# Patient Record
Sex: MALE | Race: WHITE | Employment: OTHER | URBAN - METROPOLITAN AREA
[De-identification: names, ages, dates, MRNs, and addresses within clinical notes are randomized per-mention and may not be internally consistent; named-entity substitution may affect disease eponyms.]

---

## 2021-01-01 ENCOUNTER — APPOINTMENT (OUTPATIENT)
Dept: CT IMAGING | Age: 86
DRG: 065 | End: 2021-01-01
Attending: EMERGENCY MEDICINE
Payer: MEDICARE

## 2021-01-01 ENCOUNTER — HOSPITAL ENCOUNTER (EMERGENCY)
Dept: CT IMAGING | Age: 86
Discharge: HOME | DRG: 065 | End: 2021-06-13
Attending: EMERGENCY MEDICINE
Payer: MEDICARE

## 2021-01-01 ENCOUNTER — HOSPITAL ENCOUNTER (INPATIENT)
Age: 86
LOS: 4 days | End: 2021-06-18
Attending: INTERNAL MEDICINE | Admitting: INTERNAL MEDICINE
Payer: MEDICARE

## 2021-01-01 ENCOUNTER — HOSPICE ADMISSION (OUTPATIENT)
Dept: HOSPICE | Facility: HOSPICE | Age: 86
End: 2021-01-01
Payer: MEDICARE

## 2021-01-01 ENCOUNTER — APPOINTMENT (OUTPATIENT)
Dept: GENERAL RADIOLOGY | Age: 86
DRG: 065 | End: 2021-01-01
Attending: EMERGENCY MEDICINE
Payer: MEDICARE

## 2021-01-01 ENCOUNTER — HOSPITAL ENCOUNTER (INPATIENT)
Age: 86
LOS: 1 days | Discharge: HOSPICE | DRG: 065 | End: 2021-06-14
Attending: EMERGENCY MEDICINE | Admitting: HOSPITALIST
Payer: MEDICARE

## 2021-01-01 ENCOUNTER — HOSPITAL ENCOUNTER (EMERGENCY)
Age: 86
Discharge: HOME | End: 2021-06-06
Attending: EMERGENCY MEDICINE
Payer: MEDICARE

## 2021-01-01 VITALS
HEIGHT: 68 IN | HEART RATE: 101 BPM | WEIGHT: 160 LBS | BODY MASS INDEX: 24.25 KG/M2 | RESPIRATION RATE: 22 BRPM | SYSTOLIC BLOOD PRESSURE: 194 MMHG | OXYGEN SATURATION: 94 % | TEMPERATURE: 100.4 F | DIASTOLIC BLOOD PRESSURE: 86 MMHG

## 2021-01-01 VITALS
DIASTOLIC BLOOD PRESSURE: 59 MMHG | SYSTOLIC BLOOD PRESSURE: 123 MMHG | RESPIRATION RATE: 37 BRPM | TEMPERATURE: 101 F | HEART RATE: 72 BPM

## 2021-01-01 VITALS
OXYGEN SATURATION: 97 % | WEIGHT: 160 LBS | HEART RATE: 121 BPM | BODY MASS INDEX: 24.25 KG/M2 | HEIGHT: 68 IN | SYSTOLIC BLOOD PRESSURE: 188 MMHG | RESPIRATION RATE: 16 BRPM | TEMPERATURE: 98 F | DIASTOLIC BLOOD PRESSURE: 97 MMHG

## 2021-01-01 DIAGNOSIS — K92.1 MELENA: ICD-10-CM

## 2021-01-01 DIAGNOSIS — R53.83 FATIGUE, UNSPECIFIED TYPE: Primary | ICD-10-CM

## 2021-01-01 DIAGNOSIS — R50.9 FEVER, UNSPECIFIED FEVER CAUSE: ICD-10-CM

## 2021-01-01 DIAGNOSIS — K92.2 CHRONIC UPPER GI BLEEDING: ICD-10-CM

## 2021-01-01 DIAGNOSIS — I63.9 CEREBROVASCULAR ACCIDENT (CVA), UNSPECIFIED MECHANISM (HCC): Primary | ICD-10-CM

## 2021-01-01 DIAGNOSIS — D50.9 IRON DEFICIENCY ANEMIA, UNSPECIFIED IRON DEFICIENCY ANEMIA TYPE: ICD-10-CM

## 2021-01-01 DIAGNOSIS — R41.82 ALTERED MENTAL STATUS, UNSPECIFIED ALTERED MENTAL STATUS TYPE: ICD-10-CM

## 2021-01-01 DIAGNOSIS — I48.91 ATRIAL FIBRILLATION, UNSPECIFIED TYPE (HCC): ICD-10-CM

## 2021-01-01 LAB
ABO + RH BLD: NORMAL
ALBUMIN SERPL-MCNC: 2.5 G/DL (ref 3.2–4.6)
ALBUMIN SERPL-MCNC: 3 G/DL (ref 3.2–4.6)
ALBUMIN/GLOB SERPL: 0.5 {RATIO} (ref 1.2–3.5)
ALBUMIN/GLOB SERPL: 0.6 {RATIO} (ref 1.2–3.5)
ALP SERPL-CCNC: 233 U/L (ref 50–136)
ALP SERPL-CCNC: 236 U/L (ref 50–136)
ALT SERPL-CCNC: 21 U/L (ref 12–65)
ALT SERPL-CCNC: 23 U/L (ref 12–65)
ANION GAP SERPL CALC-SCNC: 10 MMOL/L (ref 7–16)
ANION GAP SERPL CALC-SCNC: 8 MMOL/L (ref 7–16)
ANION GAP SERPL CALC-SCNC: 8 MMOL/L (ref 7–16)
APPEARANCE UR: CLEAR
APTT PPP: 33.3 SEC (ref 24.1–35.1)
ARTERIAL PATENCY WRIST A: POSITIVE
AST SERPL-CCNC: 22 U/L (ref 15–37)
AST SERPL-CCNC: 26 U/L (ref 15–37)
ATRIAL RATE: 178 BPM
ATRIAL RATE: 241 BPM
BACTERIA SPEC CULT: NORMAL
BACTERIA URNS QL MICRO: 0 /HPF
BASE DEFICIT BLD-SCNC: 0.1 MMOL/L
BASOPHILS # BLD: 0 K/UL (ref 0–0.2)
BASOPHILS # BLD: 0 K/UL (ref 0–0.2)
BASOPHILS NFR BLD: 0 % (ref 0–2)
BASOPHILS NFR BLD: 0 % (ref 0–2)
BDY SITE: ABNORMAL
BILIRUB SERPL-MCNC: 0.6 MG/DL (ref 0.2–1.1)
BILIRUB SERPL-MCNC: 0.7 MG/DL (ref 0.2–1.1)
BILIRUB UR QL: NEGATIVE
BLOOD GROUP ANTIBODIES SERPL: NORMAL
BNP SERPL-MCNC: 4875 PG/ML
BUN SERPL-MCNC: 13 MG/DL (ref 8–23)
BUN SERPL-MCNC: 14 MG/DL (ref 8–23)
BUN SERPL-MCNC: 17 MG/DL (ref 8–23)
CALCIUM SERPL-MCNC: 8.6 MG/DL (ref 8.3–10.4)
CALCIUM SERPL-MCNC: 8.7 MG/DL (ref 8.3–10.4)
CALCIUM SERPL-MCNC: 9.4 MG/DL (ref 8.3–10.4)
CALCULATED R AXIS, ECG10: 53 DEGREES
CALCULATED R AXIS, ECG10: 76 DEGREES
CALCULATED T AXIS, ECG11: 73 DEGREES
CALCULATED T AXIS, ECG11: 77 DEGREES
CASTS URNS QL MICRO: ABNORMAL /LPF
CHLORIDE SERPL-SCNC: 103 MMOL/L (ref 98–107)
CHLORIDE SERPL-SCNC: 104 MMOL/L (ref 98–107)
CHLORIDE SERPL-SCNC: 99 MMOL/L (ref 98–107)
CHOLEST SERPL-MCNC: 77 MG/DL
CO2 BLD-SCNC: 25 MMOL/L (ref 13–23)
CO2 SERPL-SCNC: 25 MMOL/L (ref 21–32)
CO2 SERPL-SCNC: 26 MMOL/L (ref 21–32)
CO2 SERPL-SCNC: 27 MMOL/L (ref 21–32)
COLOR UR: YELLOW
COVID-19 RAPID TEST, COVR: NOT DETECTED
CREAT SERPL-MCNC: 0.55 MG/DL (ref 0.8–1.5)
CREAT SERPL-MCNC: 0.62 MG/DL (ref 0.8–1.5)
CREAT SERPL-MCNC: 0.83 MG/DL (ref 0.8–1.5)
DIAGNOSIS, 93000: NORMAL
DIAGNOSIS, 93000: NORMAL
DIFFERENTIAL METHOD BLD: ABNORMAL
DIFFERENTIAL METHOD BLD: ABNORMAL
EOSINOPHIL # BLD: 0 K/UL (ref 0–0.8)
EOSINOPHIL # BLD: 0 K/UL (ref 0–0.8)
EOSINOPHIL NFR BLD: 0 % (ref 0.5–7.8)
EOSINOPHIL NFR BLD: 0 % (ref 0.5–7.8)
EPI CELLS #/AREA URNS HPF: 0 /HPF
ERYTHROCYTE [DISTWIDTH] IN BLOOD BY AUTOMATED COUNT: 20.5 % (ref 11.9–14.6)
ERYTHROCYTE [DISTWIDTH] IN BLOOD BY AUTOMATED COUNT: 21.1 % (ref 11.9–14.6)
ERYTHROCYTE [DISTWIDTH] IN BLOOD BY AUTOMATED COUNT: 21.2 % (ref 11.9–14.6)
FIO2, L/MIN - FIO2P: 3
GAS FLOW.O2 O2 DELIVERY SYS: ABNORMAL L/MIN
GLOBULIN SER CALC-MCNC: 4.6 G/DL (ref 2.3–3.5)
GLOBULIN SER CALC-MCNC: 4.8 G/DL (ref 2.3–3.5)
GLUCOSE SERPL-MCNC: 116 MG/DL (ref 65–100)
GLUCOSE SERPL-MCNC: 129 MG/DL (ref 65–100)
GLUCOSE SERPL-MCNC: 166 MG/DL (ref 65–100)
GLUCOSE UR STRIP.AUTO-MCNC: NEGATIVE MG/DL
HCO3 BLD-SCNC: 24.1 MMOL/L (ref 22–26)
HCT VFR BLD AUTO: 30.3 % (ref 41.1–50.3)
HCT VFR BLD AUTO: 31.7 % (ref 41.1–50.3)
HCT VFR BLD AUTO: 33.8 % (ref 41.1–50.3)
HDLC SERPL-MCNC: 31 MG/DL (ref 40–60)
HDLC SERPL: 2.5 {RATIO}
HGB BLD-MCNC: 10.4 G/DL (ref 13.6–17.2)
HGB BLD-MCNC: 8.9 G/DL (ref 13.6–17.2)
HGB BLD-MCNC: 9.6 G/DL (ref 13.6–17.2)
HGB RETIC QN AUTO: 26 PG (ref 29–35)
HGB UR QL STRIP: ABNORMAL
IMM GRANULOCYTES # BLD AUTO: 0.1 K/UL (ref 0–0.5)
IMM GRANULOCYTES # BLD AUTO: 0.1 K/UL (ref 0–0.5)
IMM GRANULOCYTES NFR BLD AUTO: 1 % (ref 0–5)
IMM GRANULOCYTES NFR BLD AUTO: 1 % (ref 0–5)
IMM RETICS NFR: 24 % (ref 2.3–13.4)
INR PPP: 1.2
IRON SATN MFR SERPL: 5 %
IRON SERPL-MCNC: 13 UG/DL (ref 35–150)
KETONES UR QL STRIP.AUTO: 15 MG/DL
LACTATE SERPL-SCNC: 1.5 MMOL/L (ref 0.4–2)
LACTATE SERPL-SCNC: 1.6 MMOL/L (ref 0.4–2)
LDLC SERPL CALC-MCNC: 28.4 MG/DL
LEUKOCYTE ESTERASE UR QL STRIP.AUTO: NEGATIVE
LIPASE SERPL-CCNC: 52 U/L (ref 73–393)
LYMPHOCYTES # BLD: 0.5 K/UL (ref 0.5–4.6)
LYMPHOCYTES # BLD: 0.6 K/UL (ref 0.5–4.6)
LYMPHOCYTES NFR BLD: 5 % (ref 13–44)
LYMPHOCYTES NFR BLD: 5 % (ref 13–44)
MAGNESIUM SERPL-MCNC: 2 MG/DL (ref 1.8–2.4)
MAGNESIUM SERPL-MCNC: 2.2 MG/DL (ref 1.8–2.4)
MCH RBC QN AUTO: 23.5 PG (ref 26.1–32.9)
MCH RBC QN AUTO: 23.8 PG (ref 26.1–32.9)
MCH RBC QN AUTO: 23.9 PG (ref 26.1–32.9)
MCHC RBC AUTO-ENTMCNC: 29.4 G/DL (ref 31.4–35)
MCHC RBC AUTO-ENTMCNC: 30.3 G/DL (ref 31.4–35)
MCHC RBC AUTO-ENTMCNC: 30.8 G/DL (ref 31.4–35)
MCV RBC AUTO: 77.7 FL (ref 79.6–97.8)
MCV RBC AUTO: 78.7 FL (ref 79.6–97.8)
MCV RBC AUTO: 79.9 FL (ref 79.6–97.8)
MONOCYTES # BLD: 1.1 K/UL (ref 0.1–1.3)
MONOCYTES # BLD: 1.3 K/UL (ref 0.1–1.3)
MONOCYTES NFR BLD: 12 % (ref 4–12)
MONOCYTES NFR BLD: 9 % (ref 4–12)
NEUTS SEG # BLD: 10.1 K/UL (ref 1.7–8.2)
NEUTS SEG # BLD: 8.7 K/UL (ref 1.7–8.2)
NEUTS SEG NFR BLD: 81 % (ref 43–78)
NEUTS SEG NFR BLD: 85 % (ref 43–78)
NITRITE UR QL STRIP.AUTO: NEGATIVE
NRBC # BLD: 0 K/UL (ref 0–0.2)
PCO2 BLD: 36.5 MMHG (ref 35–45)
PH BLD: 7.43 [PH] (ref 7.35–7.45)
PH UR STRIP: 6 [PH] (ref 5–9)
PLATELET # BLD AUTO: 369 K/UL (ref 150–450)
PLATELET # BLD AUTO: 411 K/UL (ref 150–450)
PLATELET # BLD AUTO: 550 K/UL (ref 150–450)
PLATELET COMMENTS,PCOM: ADEQUATE
PMV BLD AUTO: 10.1 FL (ref 9.4–12.3)
PMV BLD AUTO: 9.3 FL (ref 9.4–12.3)
PMV BLD AUTO: 9.6 FL (ref 9.4–12.3)
PO2 BLD: 151 MMHG (ref 75–100)
POTASSIUM SERPL-SCNC: 3.8 MMOL/L (ref 3.5–5.1)
POTASSIUM SERPL-SCNC: 4 MMOL/L (ref 3.5–5.1)
POTASSIUM SERPL-SCNC: 4.4 MMOL/L (ref 3.5–5.1)
PROCALCITONIN SERPL-MCNC: 0.39 NG/ML
PROT SERPL-MCNC: 7.1 G/DL (ref 6.3–8.2)
PROT SERPL-MCNC: 7.8 G/DL (ref 6.3–8.2)
PROT UR STRIP-MCNC: ABNORMAL MG/DL
PROTHROMBIN TIME: 15.2 SEC (ref 12.5–14.7)
Q-T INTERVAL, ECG07: 334 MS
Q-T INTERVAL, ECG07: 392 MS
QRS DURATION, ECG06: 92 MS
QRS DURATION, ECG06: 98 MS
QTC CALCULATION (BEZET), ECG08: 452 MS
QTC CALCULATION (BEZET), ECG08: 469 MS
RBC # BLD AUTO: 3.79 M/UL (ref 4.23–5.6)
RBC # BLD AUTO: 4.03 M/UL (ref 4.23–5.6)
RBC # BLD AUTO: 4.35 M/UL (ref 4.23–5.6)
RBC #/AREA URNS HPF: ABNORMAL /HPF
RBC MORPH BLD: ABNORMAL
RBC MORPH BLD: ABNORMAL
RETICS # AUTO: 0.08 M/UL (ref 0.03–0.1)
RETICS/RBC NFR AUTO: 1.9 % (ref 0.3–2)
SAO2 % BLD: 99.4 % (ref 95–98)
SARS-COV-2, COV2: NORMAL
SARS-COV-2, COV2: NOT DETECTED
SERVICE CMNT-IMP: ABNORMAL
SERVICE CMNT-IMP: NORMAL
SODIUM SERPL-SCNC: 134 MMOL/L (ref 136–145)
SODIUM SERPL-SCNC: 137 MMOL/L (ref 138–145)
SODIUM SERPL-SCNC: 139 MMOL/L (ref 138–145)
SOURCE, COVRS: NORMAL
SP GR UR REFRACTOMETRY: 1.02 (ref 1–1.02)
SPECIMEN EXP DATE BLD: NORMAL
SPECIMEN SOURCE, FCOV2M: NORMAL
SPECIMEN TYPE: ABNORMAL
TIBC SERPL-MCNC: 237 UG/DL (ref 250–450)
TRIGL SERPL-MCNC: 88 MG/DL (ref 35–150)
TROPONIN-HIGH SENSITIVITY: 16.2 PG/ML (ref 0–14)
TROPONIN-HIGH SENSITIVITY: 17.5 PG/ML (ref 0–14)
TROPONIN-HIGH SENSITIVITY: 302.2 PG/ML (ref 0–14)
TROPONIN-HIGH SENSITIVITY: 616.5 PG/ML (ref 0–14)
TROPONIN-HIGH SENSITIVITY: 710.2 PG/ML (ref 0–14)
TROPONIN-HIGH SENSITIVITY: 8.6 PG/ML (ref 0–14)
TSH SERPL DL<=0.005 MIU/L-ACNC: 0.61 UIU/ML (ref 0.36–3.74)
UROBILINOGEN UR QL STRIP.AUTO: 1 EU/DL (ref 0.2–1)
VENTRICULAR RATE, ECG03: 110 BPM
VENTRICULAR RATE, ECG03: 86 BPM
VLDLC SERPL CALC-MCNC: 17.6 MG/DL (ref 6–23)
WBC # BLD AUTO: 10.7 K/UL (ref 4.3–11.1)
WBC # BLD AUTO: 11.9 K/UL (ref 4.3–11.1)
WBC # BLD AUTO: 13.6 K/UL (ref 4.3–11.1)
WBC MORPH BLD: ABNORMAL
WBC URNS QL MICRO: ABNORMAL /HPF

## 2021-01-01 PROCEDURE — 74011250637 HC RX REV CODE- 250/637: Performed by: NURSE PRACTITIONER

## 2021-01-01 PROCEDURE — 74011250636 HC RX REV CODE- 250/636: Performed by: NURSE PRACTITIONER

## 2021-01-01 PROCEDURE — 85610 PROTHROMBIN TIME: CPT

## 2021-01-01 PROCEDURE — 83690 ASSAY OF LIPASE: CPT

## 2021-01-01 PROCEDURE — 85025 COMPLETE CBC W/AUTO DIFF WBC: CPT

## 2021-01-01 PROCEDURE — 74011000250 HC RX REV CODE- 250: Performed by: NURSE PRACTITIONER

## 2021-01-01 PROCEDURE — 0656 HSPC GENERAL INPATIENT

## 2021-01-01 PROCEDURE — 83735 ASSAY OF MAGNESIUM: CPT

## 2021-01-01 PROCEDURE — 74011000258 HC RX REV CODE- 258: Performed by: HOSPITALIST

## 2021-01-01 PROCEDURE — 65610000001 HC ROOM ICU GENERAL

## 2021-01-01 PROCEDURE — 74011250636 HC RX REV CODE- 250/636: Performed by: HOSPITALIST

## 2021-01-01 PROCEDURE — 80053 COMPREHEN METABOLIC PANEL: CPT

## 2021-01-01 PROCEDURE — C9113 INJ PANTOPRAZOLE SODIUM, VIA: HCPCS | Performed by: HOSPITALIST

## 2021-01-01 PROCEDURE — 36600 WITHDRAWAL OF ARTERIAL BLOOD: CPT

## 2021-01-01 PROCEDURE — 85046 RETICYTE/HGB CONCENTRATE: CPT

## 2021-01-01 PROCEDURE — 87635 SARS-COV-2 COVID-19 AMP PRB: CPT

## 2021-01-01 PROCEDURE — 99285 EMERGENCY DEPT VISIT HI MDM: CPT

## 2021-01-01 PROCEDURE — G0299 HHS/HOSPICE OF RN EA 15 MIN: HCPCS

## 2021-01-01 PROCEDURE — 83605 ASSAY OF LACTIC ACID: CPT

## 2021-01-01 PROCEDURE — 93005 ELECTROCARDIOGRAM TRACING: CPT | Performed by: EMERGENCY MEDICINE

## 2021-01-01 PROCEDURE — 99283 EMERGENCY DEPT VISIT LOW MDM: CPT

## 2021-01-01 PROCEDURE — 96374 THER/PROPH/DIAG INJ IV PUSH: CPT

## 2021-01-01 PROCEDURE — 2709999900 HC NON-CHARGEABLE SUPPLY

## 2021-01-01 PROCEDURE — 83540 ASSAY OF IRON: CPT

## 2021-01-01 PROCEDURE — 84484 ASSAY OF TROPONIN QUANT: CPT

## 2021-01-01 PROCEDURE — 80061 LIPID PANEL: CPT

## 2021-01-01 PROCEDURE — C9113 INJ PANTOPRAZOLE SODIUM, VIA: HCPCS | Performed by: EMERGENCY MEDICINE

## 2021-01-01 PROCEDURE — 87086 URINE CULTURE/COLONY COUNT: CPT

## 2021-01-01 PROCEDURE — 70450 CT HEAD/BRAIN W/O DYE: CPT

## 2021-01-01 PROCEDURE — 74011000258 HC RX REV CODE- 258: Performed by: EMERGENCY MEDICINE

## 2021-01-01 PROCEDURE — 85027 COMPLETE CBC AUTOMATED: CPT

## 2021-01-01 PROCEDURE — 0042T CT PERF W CBF: CPT

## 2021-01-01 PROCEDURE — U0005 INFEC AGEN DETEC AMPLI PROBE: HCPCS

## 2021-01-01 PROCEDURE — 74011000250 HC RX REV CODE- 250: Performed by: HOSPITALIST

## 2021-01-01 PROCEDURE — 86850 RBC ANTIBODY SCREEN: CPT

## 2021-01-01 PROCEDURE — 70496 CT ANGIOGRAPHY HEAD: CPT

## 2021-01-01 PROCEDURE — 84145 PROCALCITONIN (PCT): CPT

## 2021-01-01 PROCEDURE — 84443 ASSAY THYROID STIM HORMONE: CPT

## 2021-01-01 PROCEDURE — 96375 TX/PRO/DX INJ NEW DRUG ADDON: CPT

## 2021-01-01 PROCEDURE — 80048 BASIC METABOLIC PNL TOTAL CA: CPT

## 2021-01-01 PROCEDURE — 81001 URINALYSIS AUTO W/SCOPE: CPT

## 2021-01-01 PROCEDURE — 74011000636 HC RX REV CODE- 636: Performed by: EMERGENCY MEDICINE

## 2021-01-01 PROCEDURE — 85730 THROMBOPLASTIN TIME PARTIAL: CPT

## 2021-01-01 PROCEDURE — 74011250636 HC RX REV CODE- 250/636: Performed by: EMERGENCY MEDICINE

## 2021-01-01 PROCEDURE — 81003 URINALYSIS AUTO W/O SCOPE: CPT

## 2021-01-01 PROCEDURE — 96376 TX/PRO/DX INJ SAME DRUG ADON: CPT

## 2021-01-01 PROCEDURE — 82803 BLOOD GASES ANY COMBINATION: CPT

## 2021-01-01 PROCEDURE — 83880 ASSAY OF NATRIURETIC PEPTIDE: CPT

## 2021-01-01 PROCEDURE — 87040 BLOOD CULTURE FOR BACTERIA: CPT

## 2021-01-01 PROCEDURE — 4A03X5D MEASUREMENT OF ARTERIAL FLOW, INTRACRANIAL, EXTERNAL APPROACH: ICD-10-PCS | Performed by: RADIOLOGY

## 2021-01-01 PROCEDURE — 74011000250 HC RX REV CODE- 250: Performed by: EMERGENCY MEDICINE

## 2021-01-01 PROCEDURE — 36415 COLL VENOUS BLD VENIPUNCTURE: CPT

## 2021-01-01 PROCEDURE — 3336500001 HSPC ELECTION

## 2021-01-01 PROCEDURE — 71045 X-RAY EXAM CHEST 1 VIEW: CPT

## 2021-01-01 PROCEDURE — 74011250637 HC RX REV CODE- 250/637: Performed by: EMERGENCY MEDICINE

## 2021-01-01 RX ORDER — FUROSEMIDE 10 MG/ML
40 INJECTION INTRAMUSCULAR; INTRAVENOUS
Status: COMPLETED | OUTPATIENT
Start: 2021-01-01 | End: 2021-01-01

## 2021-01-01 RX ORDER — SODIUM CHLORIDE 0.9 % (FLUSH) 0.9 %
5-10 SYRINGE (ML) INJECTION AS NEEDED
Status: DISCONTINUED | OUTPATIENT
Start: 2021-01-01 | End: 2021-01-01 | Stop reason: HOSPADM

## 2021-01-01 RX ORDER — LABETALOL HYDROCHLORIDE 5 MG/ML
10 INJECTION, SOLUTION INTRAVENOUS
Status: DISCONTINUED | OUTPATIENT
Start: 2021-01-01 | End: 2021-01-01

## 2021-01-01 RX ORDER — MORPHINE SULFATE 2 MG/ML
2 INJECTION, SOLUTION INTRAMUSCULAR; INTRAVENOUS EVERY 6 HOURS
Status: DISCONTINUED | OUTPATIENT
Start: 2021-01-01 | End: 2021-01-01

## 2021-01-01 RX ORDER — ASPIRIN 325 MG
325 TABLET ORAL ONCE
Status: DISCONTINUED | OUTPATIENT
Start: 2021-01-01 | End: 2021-01-01

## 2021-01-01 RX ORDER — HALOPERIDOL 5 MG/ML
2 INJECTION INTRAMUSCULAR
Status: DISCONTINUED | OUTPATIENT
Start: 2021-01-01 | End: 2021-06-19 | Stop reason: HOSPADM

## 2021-01-01 RX ORDER — METOPROLOL TARTRATE 5 MG/5ML
5 INJECTION INTRAVENOUS EVERY 6 HOURS
Status: DISCONTINUED | OUTPATIENT
Start: 2021-01-01 | End: 2021-01-01

## 2021-01-01 RX ORDER — ACETAMINOPHEN 650 MG/1
650 SUPPOSITORY RECTAL
Status: DISCONTINUED | OUTPATIENT
Start: 2021-01-01 | End: 2021-06-19 | Stop reason: HOSPADM

## 2021-01-01 RX ORDER — SODIUM CHLORIDE 0.9 % (FLUSH) 0.9 %
10 SYRINGE (ML) INJECTION
Status: COMPLETED | OUTPATIENT
Start: 2021-01-01 | End: 2021-01-01

## 2021-01-01 RX ORDER — MORPHINE SULFATE 4 MG/ML
4 INJECTION INTRAVENOUS EVERY 6 HOURS
Status: DISCONTINUED | OUTPATIENT
Start: 2021-01-01 | End: 2021-06-19 | Stop reason: HOSPADM

## 2021-01-01 RX ORDER — FAMOTIDINE 20 MG/1
20 TABLET, FILM COATED ORAL EVERY 12 HOURS
Status: DISCONTINUED | OUTPATIENT
Start: 2021-01-01 | End: 2021-01-01

## 2021-01-01 RX ORDER — HYDRALAZINE HYDROCHLORIDE 20 MG/ML
20 INJECTION INTRAMUSCULAR; INTRAVENOUS ONCE
Status: COMPLETED | OUTPATIENT
Start: 2021-01-01 | End: 2021-01-01

## 2021-01-01 RX ORDER — FACIAL-BODY WIPES
10 EACH TOPICAL AS NEEDED
Status: DISCONTINUED | OUTPATIENT
Start: 2021-01-01 | End: 2021-06-19 | Stop reason: HOSPADM

## 2021-01-01 RX ORDER — MORPHINE SULFATE 4 MG/ML
4 INJECTION INTRAVENOUS
Status: DISCONTINUED | OUTPATIENT
Start: 2021-01-01 | End: 2021-06-19 | Stop reason: HOSPADM

## 2021-01-01 RX ORDER — MORPHINE SULFATE 2 MG/ML
2 INJECTION, SOLUTION INTRAMUSCULAR; INTRAVENOUS
Status: DISCONTINUED | OUTPATIENT
Start: 2021-01-01 | End: 2021-01-01

## 2021-01-01 RX ORDER — SODIUM CHLORIDE 0.9 % (FLUSH) 0.9 %
5-40 SYRINGE (ML) INJECTION EVERY 8 HOURS
Status: DISCONTINUED | OUTPATIENT
Start: 2021-01-01 | End: 2021-01-01

## 2021-01-01 RX ORDER — SODIUM CHLORIDE 0.9 % (FLUSH) 0.9 %
5-10 SYRINGE (ML) INJECTION EVERY 8 HOURS
Status: DISCONTINUED | OUTPATIENT
Start: 2021-01-01 | End: 2021-01-01 | Stop reason: HOSPADM

## 2021-01-01 RX ORDER — SODIUM CHLORIDE 0.9 % (FLUSH) 0.9 %
5-40 SYRINGE (ML) INJECTION AS NEEDED
Status: DISCONTINUED | OUTPATIENT
Start: 2021-01-01 | End: 2021-01-01 | Stop reason: HOSPADM

## 2021-01-01 RX ORDER — ACETAMINOPHEN 650 MG/1
650 SUPPOSITORY RECTAL
Status: COMPLETED | OUTPATIENT
Start: 2021-01-01 | End: 2021-01-01

## 2021-01-01 RX ORDER — LORAZEPAM 2 MG/ML
2 INJECTION INTRAMUSCULAR
Status: DISCONTINUED | OUTPATIENT
Start: 2021-01-01 | End: 2021-06-19 | Stop reason: HOSPADM

## 2021-01-01 RX ORDER — POLYETHYLENE GLYCOL 3350 17 G/17G
17 POWDER, FOR SOLUTION ORAL DAILY
Status: DISCONTINUED | OUTPATIENT
Start: 2021-01-01 | End: 2021-01-01

## 2021-01-01 RX ORDER — CARBOXYMETHYLCELLULOSE SODIUM 10 MG/ML
1 GEL OPHTHALMIC AS NEEDED
Status: DISCONTINUED | OUTPATIENT
Start: 2021-01-01 | End: 2021-06-19 | Stop reason: HOSPADM

## 2021-01-01 RX ORDER — MORPHINE SULFATE 2 MG/ML
2 INJECTION, SOLUTION INTRAMUSCULAR; INTRAVENOUS
Status: DISCONTINUED | OUTPATIENT
Start: 2021-01-01 | End: 2021-01-01 | Stop reason: HOSPADM

## 2021-01-01 RX ORDER — SODIUM CHLORIDE 0.9 % (FLUSH) 0.9 %
3 SYRINGE (ML) INJECTION AS NEEDED
Status: DISCONTINUED | OUTPATIENT
Start: 2021-01-01 | End: 2021-06-19 | Stop reason: HOSPADM

## 2021-01-01 RX ORDER — GLYCOPYRROLATE 0.2 MG/ML
0.2 INJECTION INTRAMUSCULAR; INTRAVENOUS
Status: DISCONTINUED | OUTPATIENT
Start: 2021-01-01 | End: 2021-06-19 | Stop reason: HOSPADM

## 2021-01-01 RX ORDER — VANCOMYCIN/0.9 % SOD CHLORIDE 1.5G/250ML
1500 PLASTIC BAG, INJECTION (ML) INTRAVENOUS ONCE
Status: COMPLETED | OUTPATIENT
Start: 2021-01-01 | End: 2021-01-01

## 2021-01-01 RX ORDER — SODIUM CHLORIDE 0.9 % (FLUSH) 0.9 %
3 SYRINGE (ML) INJECTION EVERY 12 HOURS
Status: DISCONTINUED | OUTPATIENT
Start: 2021-01-01 | End: 2021-06-19 | Stop reason: HOSPADM

## 2021-01-01 RX ORDER — ATORVASTATIN CALCIUM 40 MG/1
40 TABLET, FILM COATED ORAL
Status: DISCONTINUED | OUTPATIENT
Start: 2021-01-01 | End: 2021-01-01

## 2021-01-01 RX ORDER — LORAZEPAM 2 MG/ML
1 INJECTION INTRAMUSCULAR
Status: DISCONTINUED | OUTPATIENT
Start: 2021-01-01 | End: 2021-01-01 | Stop reason: HOSPADM

## 2021-01-01 RX ORDER — CARBOXYMETHYLCELLULOSE SODIUM 10 MG/ML
1 GEL OPHTHALMIC EVERY 6 HOURS
Status: DISCONTINUED | OUTPATIENT
Start: 2021-01-01 | End: 2021-06-19 | Stop reason: HOSPADM

## 2021-01-01 RX ORDER — HYDRALAZINE HYDROCHLORIDE 20 MG/ML
10 INJECTION INTRAMUSCULAR; INTRAVENOUS ONCE
Status: COMPLETED | OUTPATIENT
Start: 2021-01-01 | End: 2021-01-01

## 2021-01-01 RX ORDER — DILTIAZEM HYDROCHLORIDE 5 MG/ML
10 INJECTION INTRAVENOUS ONCE
Status: COMPLETED | OUTPATIENT
Start: 2021-01-01 | End: 2021-01-01

## 2021-01-01 RX ORDER — LANOLIN ALCOHOL/MO/W.PET/CERES
CREAM (GRAM) TOPICAL
COMMUNITY
End: 2021-01-01

## 2021-01-01 RX ORDER — OMEPRAZOLE 20 MG/1
20 CAPSULE, DELAYED RELEASE ORAL 2 TIMES DAILY
Qty: 30 CAPSULE | Refills: 0 | Status: SHIPPED | OUTPATIENT
Start: 2021-01-01 | End: 2021-01-01

## 2021-01-01 RX ADMIN — SODIUM CHLORIDE, PRESERVATIVE FREE 3 ML: 5 INJECTION INTRAVENOUS at 05:43

## 2021-01-01 RX ADMIN — MORPHINE SULFATE 2 MG: 2 INJECTION, SOLUTION INTRAMUSCULAR; INTRAVENOUS at 03:59

## 2021-01-01 RX ADMIN — MORPHINE SULFATE 4 MG: 4 INJECTION INTRAVENOUS at 12:43

## 2021-01-01 RX ADMIN — SODIUM CHLORIDE, PRESERVATIVE FREE 3 ML: 5 INJECTION INTRAVENOUS at 04:52

## 2021-01-01 RX ADMIN — SODIUM CHLORIDE, PRESERVATIVE FREE 3 ML: 5 INJECTION INTRAVENOUS at 08:29

## 2021-01-01 RX ADMIN — MORPHINE SULFATE 2 MG: 2 INJECTION, SOLUTION INTRAMUSCULAR; INTRAVENOUS at 04:52

## 2021-01-01 RX ADMIN — Medication 10 ML: at 21:12

## 2021-01-01 RX ADMIN — SODIUM CHLORIDE, PRESERVATIVE FREE 3 ML: 5 INJECTION INTRAVENOUS at 03:59

## 2021-01-01 RX ADMIN — PIPERACILLIN AND TAZOBACTAM 3.38 G: 3; .375 INJECTION, POWDER, LYOPHILIZED, FOR SOLUTION INTRAVENOUS at 21:14

## 2021-01-01 RX ADMIN — SODIUM CHLORIDE, PRESERVATIVE FREE 3 ML: 5 INJECTION INTRAVENOUS at 20:29

## 2021-01-01 RX ADMIN — METOPROLOL TARTRATE 5 MG: 5 INJECTION INTRAVENOUS at 21:14

## 2021-01-01 RX ADMIN — MORPHINE SULFATE 2 MG: 2 INJECTION, SOLUTION INTRAMUSCULAR; INTRAVENOUS at 10:20

## 2021-01-01 RX ADMIN — SODIUM CHLORIDE, PRESERVATIVE FREE 3 ML: 5 INJECTION INTRAVENOUS at 10:22

## 2021-01-01 RX ADMIN — MORPHINE SULFATE 2 MG: 2 INJECTION, SOLUTION INTRAMUSCULAR; INTRAVENOUS at 20:29

## 2021-01-01 RX ADMIN — Medication 10 ML: at 17:03

## 2021-01-01 RX ADMIN — SODIUM CHLORIDE, PRESERVATIVE FREE 3 ML: 5 INJECTION INTRAVENOUS at 17:51

## 2021-01-01 RX ADMIN — SODIUM CHLORIDE, PRESERVATIVE FREE 3 ML: 5 INJECTION INTRAVENOUS at 15:27

## 2021-01-01 RX ADMIN — SODIUM CHLORIDE, PRESERVATIVE FREE 3 ML: 5 INJECTION INTRAVENOUS at 02:07

## 2021-01-01 RX ADMIN — SODIUM CHLORIDE, PRESERVATIVE FREE 3 ML: 5 INJECTION INTRAVENOUS at 07:42

## 2021-01-01 RX ADMIN — HALOPERIDOL LACTATE 2 MG: 5 INJECTION, SOLUTION INTRAMUSCULAR at 17:38

## 2021-01-01 RX ADMIN — MORPHINE SULFATE 2 MG: 2 INJECTION, SOLUTION INTRAMUSCULAR; INTRAVENOUS at 22:15

## 2021-01-01 RX ADMIN — HYDRALAZINE HYDROCHLORIDE 20 MG: 20 INJECTION, SOLUTION INTRAMUSCULAR; INTRAVENOUS at 16:34

## 2021-01-01 RX ADMIN — SODIUM CHLORIDE, PRESERVATIVE FREE 3 ML: 5 INJECTION INTRAVENOUS at 21:08

## 2021-01-01 RX ADMIN — HALOPERIDOL LACTATE 2 MG: 5 INJECTION, SOLUTION INTRAMUSCULAR at 10:21

## 2021-01-01 RX ADMIN — MORPHINE SULFATE 2 MG: 2 INJECTION, SOLUTION INTRAMUSCULAR; INTRAVENOUS at 17:10

## 2021-01-01 RX ADMIN — PANTOPRAZOLE SODIUM 40 MG: 40 INJECTION, POWDER, FOR SOLUTION INTRAVENOUS at 08:19

## 2021-01-01 RX ADMIN — MORPHINE SULFATE 2 MG: 2 INJECTION, SOLUTION INTRAMUSCULAR; INTRAVENOUS at 05:43

## 2021-01-01 RX ADMIN — MORPHINE SULFATE 2 MG: 2 INJECTION, SOLUTION INTRAMUSCULAR; INTRAVENOUS at 15:26

## 2021-01-01 RX ADMIN — PANTOPRAZOLE SODIUM 40 MG: 40 INJECTION, POWDER, FOR SOLUTION INTRAVENOUS at 21:14

## 2021-01-01 RX ADMIN — MORPHINE SULFATE 2 MG: 2 INJECTION, SOLUTION INTRAMUSCULAR; INTRAVENOUS at 16:34

## 2021-01-01 RX ADMIN — SODIUM CHLORIDE, PRESERVATIVE FREE 3 ML: 5 INJECTION INTRAVENOUS at 17:11

## 2021-01-01 RX ADMIN — ACETAMINOPHEN 650 MG: 650 SUPPOSITORY RECTAL at 17:10

## 2021-01-01 RX ADMIN — Medication 10 ML: at 05:22

## 2021-01-01 RX ADMIN — ACETAMINOPHEN 650 MG: 650 SUPPOSITORY RECTAL at 15:36

## 2021-01-01 RX ADMIN — PIPERACILLIN AND TAZOBACTAM 3.38 G: 3; .375 INJECTION, POWDER, LYOPHILIZED, FOR SOLUTION INTRAVENOUS at 05:22

## 2021-01-01 RX ADMIN — PANTOPRAZOLE SODIUM 40 MG: 40 INJECTION, POWDER, FOR SOLUTION INTRAVENOUS at 18:03

## 2021-01-01 RX ADMIN — IOPAMIDOL 100 ML: 755 INJECTION, SOLUTION INTRAVENOUS at 16:59

## 2021-01-01 RX ADMIN — DILTIAZEM HYDROCHLORIDE 10 MG: 5 INJECTION INTRAVENOUS at 18:01

## 2021-01-01 RX ADMIN — SODIUM CHLORIDE, PRESERVATIVE FREE 3 ML: 5 INJECTION INTRAVENOUS at 20:22

## 2021-01-01 RX ADMIN — CARBOXYMETHYLCELLULOSE SODIUM 1 DROP: 10 GEL OPHTHALMIC at 17:51

## 2021-01-01 RX ADMIN — MORPHINE SULFATE 2 MG: 2 INJECTION, SOLUTION INTRAMUSCULAR; INTRAVENOUS at 10:22

## 2021-01-01 RX ADMIN — SODIUM CHLORIDE, PRESERVATIVE FREE 3 ML: 5 INJECTION INTRAVENOUS at 10:21

## 2021-01-01 RX ADMIN — MORPHINE SULFATE 2 MG: 2 INJECTION, SOLUTION INTRAMUSCULAR; INTRAVENOUS at 21:08

## 2021-01-01 RX ADMIN — MORPHINE SULFATE 2 MG: 2 INJECTION, SOLUTION INTRAMUSCULAR; INTRAVENOUS at 20:54

## 2021-01-01 RX ADMIN — SODIUM CHLORIDE, PRESERVATIVE FREE 3 ML: 5 INJECTION INTRAVENOUS at 20:54

## 2021-01-01 RX ADMIN — PIPERACILLIN SODIUM AND TAZOBACTAM SODIUM 4.5 G: 4; .5 INJECTION, POWDER, LYOPHILIZED, FOR SOLUTION INTRAVENOUS at 17:24

## 2021-01-01 RX ADMIN — SODIUM CHLORIDE, PRESERVATIVE FREE 3 ML: 5 INJECTION INTRAVENOUS at 12:44

## 2021-01-01 RX ADMIN — MORPHINE SULFATE 4 MG: 4 INJECTION INTRAVENOUS at 20:22

## 2021-01-01 RX ADMIN — MORPHINE SULFATE 2 MG: 2 INJECTION, SOLUTION INTRAMUSCULAR; INTRAVENOUS at 09:36

## 2021-01-01 RX ADMIN — MORPHINE SULFATE 4 MG: 4 INJECTION INTRAVENOUS at 17:50

## 2021-01-01 RX ADMIN — HALOPERIDOL LACTATE 2 MG: 5 INJECTION, SOLUTION INTRAMUSCULAR at 17:10

## 2021-01-01 RX ADMIN — MORPHINE SULFATE 2 MG: 2 INJECTION, SOLUTION INTRAMUSCULAR; INTRAVENOUS at 17:38

## 2021-01-01 RX ADMIN — METOPROLOL TARTRATE 5 MG: 5 INJECTION INTRAVENOUS at 05:22

## 2021-01-01 RX ADMIN — MORPHINE SULFATE 2 MG: 2 INJECTION, SOLUTION INTRAMUSCULAR; INTRAVENOUS at 02:07

## 2021-01-01 RX ADMIN — FUROSEMIDE 40 MG: 10 INJECTION, SOLUTION INTRAMUSCULAR; INTRAVENOUS at 16:34

## 2021-01-01 RX ADMIN — SODIUM CHLORIDE, PRESERVATIVE FREE 3 ML: 5 INJECTION INTRAVENOUS at 09:13

## 2021-01-01 RX ADMIN — ACETAMINOPHEN 650 MG: 650 SUPPOSITORY RECTAL at 16:33

## 2021-01-01 RX ADMIN — HYDRALAZINE HYDROCHLORIDE 10 MG: 20 INJECTION, SOLUTION INTRAMUSCULAR; INTRAVENOUS at 15:36

## 2021-01-01 RX ADMIN — SODIUM CHLORIDE, PRESERVATIVE FREE 3 ML: 5 INJECTION INTRAVENOUS at 09:36

## 2021-01-01 RX ADMIN — MORPHINE SULFATE 2 MG: 2 INJECTION, SOLUTION INTRAMUSCULAR; INTRAVENOUS at 07:42

## 2021-01-01 RX ADMIN — SODIUM CHLORIDE, PRESERVATIVE FREE 3 ML: 5 INJECTION INTRAVENOUS at 16:34

## 2021-01-01 RX ADMIN — VANCOMYCIN HYDROCHLORIDE 1500 MG: 10 INJECTION, POWDER, LYOPHILIZED, FOR SOLUTION INTRAVENOUS at 17:24

## 2021-01-01 RX ADMIN — SODIUM CHLORIDE 100 ML: 900 INJECTION, SOLUTION INTRAVENOUS at 17:03

## 2021-01-01 RX ADMIN — SODIUM CHLORIDE, PRESERVATIVE FREE 3 ML: 5 INJECTION INTRAVENOUS at 22:16

## 2021-01-01 RX ADMIN — ACETAMINOPHEN 650 MG: 650 SUPPOSITORY RECTAL at 17:08

## 2021-06-06 NOTE — DISCHARGE INSTRUCTIONS
Start acid medication. Call your primary care doctor to recheck to see if your irregular heartbeat is new or old. If new, you may need to have other testing done. Gastrointestinal doctor should call you tomorrow with follow-up appointment. Recheck sooner for passing out or much worse bleeding.

## 2021-06-06 NOTE — ED NOTES
I have reviewed discharge instructions with the patient. The patient verbalized understanding. Patient left ED via Discharge Method: wheelchair to Home with self. Opportunity for questions and clarification provided. Patient given 1 scripts. To continue your aftercare when you leave the hospital, you may receive an automated call from our care team to check in on how you are doing. This is a free service and part of our promise to provide the best care and service to meet your aftercare needs.  If you have questions, or wish to unsubscribe from this service please call [phone]. Thank you for Choosing our [address] [place] [address] Emergency Department.

## 2021-06-06 NOTE — ED PROVIDER NOTES
81-year-old male presents with increasing fatigue along with some appetite and weight loss and feeling generally weak and somewhat lightheaded over the last number weeks. He has had no vomiting or diarrhea. Does not know of any active bleeding his bowels. His bowel movements are dark but takes iron. He denies fever chills. No headache no chest pain or shortness of breath no abdominal pain. States he does have some slight issues with food sticking when he swallows. No syncope. Patient has a history of hypertension. Also history of peptic ulcer disease with bleeding. Evidently has some sort of issue with surgery that may have been for gastric outlet obstruction years ago. I cannot locate much in the old records with patient thinks his last hemoglobin was 12 last year. Does take iron. The history is provided by the patient. Fatigue This is a new problem. The current episode started more than 1 week ago. The problem has been gradually worsening. There was no focality noted. Pertinent negatives include no focal weakness, no loss of sensation, no loss of balance and no slurred speech. There has been no fever. Pertinent negatives include no shortness of breath, no chest pain, no vomiting, no headaches and no nausea. There were no medications administered prior to arrival.  
  
 
Past Medical History:  
Diagnosis Date  GERD (gastroesophageal reflux disease)  Hypertension  PUD (peptic ulcer disease)   
 hx of bleeding ulcers-surgery in '07 Past Surgical History:  
Procedure Laterality Date  HX GI    
 removal of small portion of stomach /small intestines  HX HEENT    
 left eye x2 Family History:  
Problem Relation Age of Onset  Hypertension Sister  Heart Disease Mother  Heart Disease Brother Social History Socioeconomic History  Marital status: SINGLE Spouse name: Not on file  Number of children: Not on file  Years of education: Not on file  Highest education level: Not on file Occupational History  Not on file Tobacco Use  Smoking status: Former Smoker Packs/day: 0.50 Years: 25.00 Pack years: 12.50 Quit date: 10/20/1990 Years since quittin.6  Smokeless tobacco: Former User  Tobacco comment: quit 15 years ago Substance and Sexual Activity  Alcohol use: Yes Alcohol/week: 0.8 standard drinks Types: 1 Cans of beer per week Comment: \"occasional\"  Drug use: No  
 Sexual activity: Not on file Other Topics Concern  Not on file Social History Narrative  Not on file Social Determinants of Health Financial Resource Strain:  Difficulty of Paying Living Expenses:   
Food Insecurity:  Worried About [address] in the Last Year:   
[address] in the Last Year:   
Transportation Needs:   
 Lack of Transportation (Medical):  Lack of Transportation (Non-Medical): Physical Activity:   
 Days of Exercise per Week:  Minutes of Exercise per Session:   
Stress:  Feeling of Stress :   
Social Connections:  Frequency of Communication with Friends and Family:  Frequency of Social Gatherings with Friends and Family:  Attends [employer] Services:  Active Member of Clubs or Organizations:  Attends Club or Organization Meetings:  Marital Status: Intimate Partner Violence:  Fear of Current or Ex-Partner:  Emotionally Abused:   
 Physically Abused:   
 Sexually Abused: ALLERGIES: Patient has no known allergies. Review of Systems Constitutional: Positive for fatigue. Negative for chills and fever. Respiratory: Negative for cough and shortness of breath. Cardiovascular: Negative for chest pain and palpitations. Gastrointestinal: Negative for abdominal pain, blood in stool, constipation, diarrhea, nausea and vomiting. Genitourinary: Negative for dysuria and flank pain.   
Musculoskeletal: Negative for back pain and neck pain. Skin: Negative for color change and rash. Neurological: Negative for focal weakness, syncope, headaches and loss of balance. All other systems reviewed and are negative. Vitals:  
 06/06/21 1415 BP: (!) 188/97 Pulse: (!) 121 Resp: 16 Temp: 98 °F (36.7 °C) SpO2: 97% Weight: 72.6 kg (160 lb) Height: 5' 8\" (1.727 m) Physical Exam 
Vitals and nursing note reviewed. Constitutional:   
   General: He is not in acute distress. Appearance: He is well-developed. Comments: Underweight. HENT:  
   Head: Normocephalic and atraumatic. Right Ear: External ear normal.  
   Left Ear: External ear normal.  
   Mouth/Throat:  
   Pharynx: No oropharyngeal exudate. Eyes:  
   Conjunctiva/sclera: Conjunctivae normal.  
   Pupils: Pupils are equal, round, and reactive to light. Cardiovascular:  
   Rate and Rhythm: Normal rate and regular rhythm. Heart sounds: No murmur heard. Pulmonary:  
   Effort: No respiratory distress. Breath sounds: Normal breath sounds. Abdominal:  
   General: Bowel sounds are normal.  
   Palpations: Abdomen is soft. There is no mass. Tenderness: There is no abdominal tenderness. There is no guarding or rebound. Hernia: No hernia is present. Genitourinary: 
   Rectum: Guaiac result positive. No mass or tenderness. Comments: Rectal exam does reveal some dark-colored stool which test moderately positive for heme. Musculoskeletal:  
   Cervical back: Normal range of motion and neck supple. Skin: 
   General: Skin is warm and dry. Neurological:  
   Mental Status: He is alert and oriented to person, place, and time. Gait: Gait normal.  
   Comments: Nl speech Psychiatric:     
   Speech: Speech normal.  
 
  
 
MDM Number of Diagnoses or Management Options Diagnosis management comments: Check EKG and troponin. Check orthostatics. Screen for anemia, dehydration or electrolyte abnormalities. Amount and/or Complexity of Data Reviewed Clinical lab tests: ordered and reviewed Tests in the medicine section of CPT®: reviewed and ordered Discuss the patient with other providers: yes Risk of Complications, Morbidity, and/or Mortality Presenting problems: moderate Diagnostic procedures: minimal 
Management options: low Patient Progress Patient progress: stable Procedures Results Include: 
 
Recent Results (from the past 24 hour(s)) CBC WITH AUTOMATED DIFF Collection Time: 06/06/21  2:26 PM  
Result Value Ref Range WBC 10.7 4.3 - 11.1 K/uL  
 RBC 3.79 (L) 4.23 - 5.6 M/uL HGB 8.9 (L) 13.6 - 17.2 g/dL HCT 30.3 (L) 41.1 - 50.3 % MCV 79.9 79.6 - 97.8 FL  
 MCH 23.5 (L) 26.1 - 32.9 PG  
 MCHC 29.4 (L) 31.4 - 35.0 g/dL RDW 21.2 (H) 11.9 - 14.6 % PLATELET 548 (H) 558 - 450 K/uL MPV 9.3 (L) 9.4 - 12.3 FL ABSOLUTE NRBC 0.00 0.0 - 0.2 K/uL  
 DF AUTOMATED NEUTROPHILS 81 (H) 43 - 78 % LYMPHOCYTES 5 (L) 13 - 44 % MONOCYTES 12 4.0 - 12.0 % EOSINOPHILS 0 (L) 0.5 - 7.8 % BASOPHILS 0 0.0 - 2.0 % IMMATURE GRANULOCYTES 1 0.0 - 5.0 %  
 ABS. NEUTROPHILS 8.7 (H) 1.7 - 8.2 K/UL  
 ABS. LYMPHOCYTES 0.5 0.5 - 4.6 K/UL  
 ABS. MONOCYTES 1.3 0.1 - 1.3 K/UL  
 ABS. EOSINOPHILS 0.0 0.0 - 0.8 K/UL  
 ABS. BASOPHILS 0.0 0.0 - 0.2 K/UL  
 ABS. IMM. GRANS. 0.1 0.0 - 0.5 K/UL METABOLIC PANEL, COMPREHENSIVE Collection Time: 06/06/21  2:26 PM  
Result Value Ref Range Sodium 134 (L) 136 - 145 mmol/L Potassium 4.4 3.5 - 5.1 mmol/L Chloride 99 98 - 107 mmol/L  
 CO2 27 21 - 32 mmol/L Anion gap 8 7 - 16 mmol/L Glucose 166 (H) 65 - 100 mg/dL BUN 17 8 - 23 MG/DL Creatinine 0.83 0.8 - 1.5 MG/DL  
 GFR est AA >60 >60 ml/min/1.73m2 GFR est non-AA >60 >60 ml/min/1.73m2 Calcium 9.4 8.3 - 10.4 MG/DL Bilirubin, total 0.6 0.2 - 1.1 MG/DL  
 ALT (SGPT) 21 12 - 65 U/L  
 AST (SGOT) 22 15 - 37 U/L Alk. phosphatase 236 (H) 50 - 136 U/L  Protein, total 7.8 6.3 - 8.2 g/dL Albumin 3.0 (L) 3.2 - 4.6 g/dL Globulin 4.8 (H) 2.3 - 3.5 g/dL A-G Ratio 0.6 (L) 1.2 - 3.5 LIPASE Collection Time: 06/06/21  2:26 PM  
Result Value Ref Range Lipase 52 (L) 73 - 393 U/L  
TROPONIN-HIGH SENSITIVITY Collection Time: 06/06/21  2:26 PM  
Result Value Ref Range Troponin-High Sensitivity 8.6 0 - 14 pg/mL EKG, 12 LEAD, INITIAL Collection Time: 06/06/21  3:58 PM  
Result Value Ref Range Ventricular Rate 86 BPM  
 Atrial Rate 241 BPM  
 QRS Duration 98 ms Q-T Interval 392 ms QTC Calculation (Bezet) 469 ms Calculated R Axis 53 degrees Calculated T Axis 73 degrees Diagnosis Atrial fibrillation with premature ventricular or aberrantly conducted  
complexes Nonspecific ST abnormality Abnormal ECG No previous ECGs available 4:14 PM 
Orthostatics negative. Discussed with cardiology. Hold Plavix for 48 hours. Discussed with GI for quick follow-up. My interpretation of EKG shows atrial fibrillation at a rate of 85.  1 PVC. Nonspecific ST changes. Patient states has had irregular heartbeat in the past.  Cannot find any old records. We will have him follow-up with his primary care doctor to recheck this. Even if this is atrial fibrillation, in the setting of bleeding, would not anticoagulate at this point.

## 2021-06-06 NOTE — ED TRIAGE NOTES
Pt states he has been feeling extremely weak for about two weeks. Pt appears pale but denies bleeding. States he has had rectal bleeding in the past and has had multiple abd surgeries. States he is on iron. Denies SOB. Pt lives at home alone. Masked for triage.

## 2021-06-13 PROBLEM — I63.9 CVA (CEREBRAL VASCULAR ACCIDENT) (HCC): Status: ACTIVE | Noted: 2021-01-01

## 2021-06-13 NOTE — ED TRIAGE NOTES
Patient arrives via EMS from home. Called out by nephleticia for sick person. States found supine in bed, unresponsive. Not at baseline- normally independent and alert and oriented. Initially hypoxic, 88%. Found to be febrile, tachy, and tachypnic with crackles in lungs. Given 1g rocephin and 1L NS.

## 2021-06-13 NOTE — ACP (ADVANCE CARE PLANNING)
Advance care planninginitial encounter Face to face time - 16 minutes face-to-face time spent discussion the care Pt's decision making capacity  
[] Yes [x ] NO Names of POA/surrogate decision maker when patient is altered 
:dannielle Abrams (via phone 
  
 
Other members present in the meeting :  Philip Tijerina 
  
 
Patient / surrogate decision-maker ultimately chose. .. [] Full code- full aggressive medical and surgical interventions, including intubations, resuscitations, pressors, artificial tube feeding [ ] DO NOT RESUSCITATE -okay to intubate,  and other aggressive medical and surgical intervention [ x] Not resuscitate, DO NOT INTUBATE, but okay for other aggressive medical and surgical intervention [ ] DNR/DNI, hospice, comfort focus care to maximize patient's quality of life [ ] DNR/DNI, strict comfort care ONLY Further discussion regarding principle disease process. prognosis, plans of care, and treatment goals 
: Diagnostic finding discussed, patient is being admitted for multiple issues including acute ischemic stroke, A. 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 Therapeutic options discussed, DNR/DNI code confirmed, power of  /designated surrogate decision-maker confirmed, all questions answered

## 2021-06-13 NOTE — ED PROVIDER NOTES
81-year-old male with history of hypertension, CAD, PUD, restless leg syndrome, cataract left eye, anemia who presents via EMS from home with altered mental status. Patient's nephew states that patient has had increasing generalized weakness and fatigue over the past week. States that he saw him yesterday at 3 PM and that he was weak unable to get out of a chair but talking appropriately but was \"not his normal self\". Nephew states that patient was seen by gastroenterology last Thursday reportedly had a fall hitting his head at that time. States that he later contacted his son around 5 PM yesterday and spoke with him briefly and that he was not completely himself. Nephew denies previous history of CVA. EMS found patient supine in bed and unresponsive. Patient is normally independent and alert and oriented x4 at baseline. Patient noted to be febrile, tachycardic, hypoxic with EMS. Initially found to be 88% on room air. Patient placed on O2 via nasal cannula. Patient was given 1 g Rocephin and 1 L normal saline by EMS. The history is provided by the patient. The history is limited by the condition of the patient. No  was used. Fatigue This is a new problem. The current episode started more than 2 days ago. The problem has been gradually worsening. There was no focality noted. Primary symptoms include mental status change. There has been a fever of 100 - 100.9 F. Associated symptoms include shortness of breath, altered mental status and confusion. Pertinent negatives include no vomiting. Past Medical History:  
Diagnosis Date  Anemia 06/2021  Arrhythmia Pt states he was once told he had an irregular heart beat several times, denies afib--- 6/6/21 afib on EKG  Blind left eye  Former smoker, stopped smoking in distant past   
 Quit ~1996-- 0.5 ppd x 12 years  GERD (gastroesophageal reflux disease)  Hypertension  MI, old 2006  
 [facility] heart cath 2006- previous mi but no stents were required  PUD (peptic ulcer disease) 2010  
 multiple-- last ulcer  -   surgery/partial gastrectomy in 2010  Restless legs  Swallowing problem 06/10/2021 Past Surgical History:  
Procedure Laterality Date  HX CATARACT REMOVAL Right  HX ENDOSCOPY    
 multiple  HX GASTRECTOMY  2010  
 removal of small portion of stomach /small intestines  HX RETINAL DETACHMENT REPAIR Left   
 left eye x2  HX VITRECTOMY Left   
 air bubble Family History:  
Problem Relation Age of Onset  Hypertension Sister  Heart Disease Mother  Deep Vein Thrombosis Father  Heart Disease Brother Social History Socioeconomic History  Marital status: SINGLE Spouse name: Not on file  Number of children: Not on file  Years of education: Not on file  Highest education level: Not on file Occupational History  Not on file Tobacco Use  Smoking status: Former Smoker Packs/day: 0.50 Years: 12.00 Pack years: 6.00 Quit date:  Years since quittin.4  Smokeless tobacco: Never Used Vaping Use  Vaping Use: Never used Substance and Sexual Activity  Alcohol use: Not Currently Comment: \"occasional\"  Drug use: No  
 Sexual activity: Not on file Other Topics Concern  Not on file Social History Narrative  Not on file Social Determinants of Health Financial Resource Strain:  Difficulty of Paying Living Expenses:   
Food Insecurity:  Worried About [facility] in the Last Year:   
[address] in the Last Year:   
Transportation Needs:   
 Lack of Transportation (Medical):  Lack of Transportation (Non-Medical): Physical Activity:   
 Days of Exercise per Week:  Minutes of Exercise per Session:   
Stress:  Feeling of Stress :   
Social Connections:  Frequency of Communication with Friends and Family:  Frequency of Social Gatherings with Friends and Family:  Attends Worship Services:  Active Member of Clubs or Organizations:  Attends Club or Organization Meetings:  Marital Status: Intimate Partner Violence:  Fear of Current or Ex-Partner:  Emotionally Abused:   
 Physically Abused:   
 Sexually Abused: ALLERGIES: Patient has no known allergies. Review of Systems Unable to perform ROS: Mental status change Constitutional: Positive for fatigue and fever. Respiratory: Positive for shortness of breath. Gastrointestinal: Negative for vomiting. Psychiatric/Behavioral: Positive for confusion. Vitals:  
 06/13/21 1520 BP: (!) 227/141 Pulse: (!) 111 Resp: 24 Temp: (!) 100.8 °F (38.2 °C) SpO2: 99% Weight: 72.6 kg (160 lb) Height: 5' 8\" (1.727 m) Physical Exam 
Vitals and nursing note reviewed. Constitutional:   
   Comments: Altered. Ill-appearing. HENT:  
   Head: Atraumatic. Mouth/Throat:  
   Mouth: Mucous membranes are moist.  
Eyes:  
   Comments: Cataract noted to left eye. Vision deviated to the left. Cardiovascular:  
   Rate and Rhythm: Regular rhythm. Tachycardia present. Heart sounds: Normal heart sounds. Pulmonary:  
   Comments: Tachypneic. Diffuse crackles. Abdominal:  
   General: There is no distension. Palpations: Abdomen is soft. Tenderness: There is no abdominal tenderness. There is no guarding. Comments: Soft, nontender, nondistended. No rebound or guarding. Genitourinary: 
   Comments: Melanotic stool on exam.  
Musculoskeletal:     
   General: No tenderness. Cervical back: No rigidity. Comments: No deformities noted. Skin: 
   General: Skin is warm. Findings: No rash. Neurological:  
   Mental Status: He is lethargic. Comments: Altered. No meningismus. Moving all extremities equally. MDM Number of Diagnoses or Management Options Altered mental status, unspecified altered mental status type: new and requires workup Atrial fibrillation, unspecified type ([facility] [address] Cerebrovascular accident (CVA), unspecified mechanism [facility]): new and requires workup Fever, unspecified fever cause: new and requires workup Melena Diagnosis management comments: Thought to be last known normal at around 3 PM.  Family does state that even at that time patient was having generalized weakness and fatigue. Patient has been having worsening weakness and fatigue for the past week. No Code S called given symptoms greater than 24 hours. Febrile. Hypertensive on arrival. 
Given hydralazine. Acetaminophen suppository. Given febrile and concern for sepsis we will add additional coverage with vancomycin and Zosyn. UA subsequently negative for UTI. Chest x-ray negative for consolidation or infiltrate. CT head with chronic findings. Given concern for CVA, CTA head neck and CT PE brain ordered. 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 Harlan Sandhoff, NP contacted states will speak w/ Dr. Anay Alvarado. Dr. Anay Alvarado states no intervention at this time. Hospitalist consulted for admission. Attempted contacting nephew (cell and home phone) back after CTA resulted. No answer. Nephew later called back & states patient is DNR. Amount and/or Complexity of Data Reviewed Clinical lab tests: ordered and reviewed Tests in the radiology section of CPT®: ordered and reviewed Tests in the medicine section of CPT®: reviewed and ordered Review and summarize past medical records: yes Discuss the patient with other providers: yes Independent visualization of images, tracings, or specimens: yes Risk of Complications, Morbidity, and/or Mortality Presenting problems: high Diagnostic procedures: high Management options: high Patient Progress Patient progress: other (comment) (Guarded) ED Course as of Jun 13 1757 Sun Jun 13, 2021  
1602 HGB(!): 9.6 [DF] 1603 WBC(!): 11.9 [DF] 1604 CXR   
Findings:  There is mild coarsening of the interstitial lung markings without 
focal alveolar infiltrate or pleural effusion. The mediastinal contour and 
osseous structures are normal. 
  
IMPRESSIONs: No acute findings. [DF] 1621 CT head Findings: The ventricles are normal in size, shape, and position. There is 
generalized cerebral atrophy with chronic appearing white matter change in the 
corona radiata and centrum semiovale. Postsurgical change of the left orbit 
noted. . No extra-axial fluid collection is present. There is no mass or 
mass-effect. The basilar cisterns are patent. The paranasal sinuses and mastoid 
air cells are clear. 
  
IMPRESSION Chronic appearing change without acute intracranial abnormality. 
   
 [DF] 2436 Received call from IR in regards to CTA and CTP reads. [DF] [facility], NP on call for Neurointerventional contacted in regards to findings. [DF] [phone] rapid test: Not detected [DF] ED Course User Index 
[DF] Raul Badillo MD  
 
 
EKG Date/Time: 6/13/2021 3:33 PM 
Performed by: Raul Badillo MD 
Authorized by: Raul Badillo MD  
 
ECG reviewed by ED Physician in the absence of a cardiologist: yes Rate:  
  ECG rate:  110 ECG rate assessment: tachycardic Rhythm:  
  Rhythm: atrial fibrillation Ectopy:  
  Ectopy: none QRS:  
  QRS axis:  Normal 
  QRS intervals:  Normal 
Conduction:  
  Conduction: normal   
ST segments: ST segments:  Normal 
T waves:  
  T waves: normal   
 
Critical Care Performed by: Raul Badillo MD 
Authorized by: Raul Badillo MD  
 
Critical care provider statement:  
  Critical care time (minutes):  45 
  Critical care time was exclusive of:  Separately billable procedures and treating other patients   Critical care was necessary to treat or prevent imminent or life-threatening deterioration of the following conditions:  Cardiac failure, circulatory failure, CNS failure or compromise, sepsis and respiratory failure Critical care was time spent personally by me on the following activities:  Blood draw for specimens, development of treatment plan with patient or surrogate, discussions with consultants, discussions with primary provider, evaluation of patient's response to treatment, examination of patient, obtaining history from patient or surrogate, ordering and performing treatments and interventions, ordering and review of laboratory studies, ordering and review of radiographic studies, pulse oximetry, re-evaluation of patient's condition and review of old charts I assumed direction of critical care for this patient from another provider in my specialty: yes CT PERF W CBF Final Result Findings consistent with a large perfusion abnormality involving a large core  
infarct throughout the left cerebral hemisphere involving essentially the  
entirety of the left MCA and ERI territories as well as the left MCA/PCA  
watershed territory. This is consistent with the findings of occlusion within the anterior  
circulation noted on the CTA head and neck exam.  
  
Findings of large acute left cerebral hemispheric infarct communicated to Dr. Kerry Simpson at the time of CT perfusion dictation at 1740 hours. CT HEAD WO CONT Final Result Chronic appearing change without acute intracranial abnormality. XR CHEST PORT Final Result CTA HEAD NECK W WO CONT    (Results Pending) Results Include: 
 
Recent Results (from the past 24 hour(s)) EKG, 12 LEAD, INITIAL Collection Time: 06/13/21  3:19 PM  
Result Value Ref Range Ventricular Rate 110 BPM  
 Atrial Rate 178 BPM  
 QRS Duration 92 ms Q-T Interval 334 ms QTC Calculation (Bezet) 452 ms Calculated R Axis 76 degrees Calculated T Axis 77 degrees Diagnosis    
  !! AGE AND GENDER SPECIFIC ECG ANALYSIS !!  
Atrial fibrillation with rapid ventricular response with premature  
ventricular or aberrantly conducted complexes ST & T wave abnormality, consider lateral ischemia or digitalis effect Abnormal ECG When compared with ECG of 06-JUN-2021 15:58, 
T wave inversion more evident in Lateral leads LACTIC ACID Collection Time: 06/13/21  3:28 PM  
Result Value Ref Range Lactic acid 1.5 0.4 - 2.0 MMOL/L  
CBC WITH AUTOMATED DIFF Collection Time: 06/13/21  3:28 PM  
Result Value Ref Range WBC 11.9 (H) 4.3 - 11.1 K/uL  
 RBC 4.03 (L) 4.23 - 5.6 M/uL HGB 9.6 (L) 13.6 - 17.2 g/dL HCT 31.7 (L) 41.1 - 50.3 % MCV 78.7 (L) 79.6 - 97.8 FL  
 MCH 23.8 (L) 26.1 - 32.9 PG  
 MCHC 30.3 (L) 31.4 - 35.0 g/dL RDW 20.5 (H) 11.9 - 14.6 % PLATELET 039 468 - 608 K/uL MPV 9.6 9.4 - 12.3 FL ABSOLUTE NRBC 0.00 0.0 - 0.2 K/uL NEUTROPHILS 85 (H) 43 - 78 % LYMPHOCYTES 5 (L) 13 - 44 % MONOCYTES 9 4.0 - 12.0 % EOSINOPHILS 0 (L) 0.5 - 7.8 % BASOPHILS 0 0.0 - 2.0 % IMMATURE GRANULOCYTES 1 0.0 - 5.0 %  
 ABS. NEUTROPHILS 10.1 (H) 1.7 - 8.2 K/UL  
 ABS. LYMPHOCYTES 0.6 0.5 - 4.6 K/UL  
 ABS. MONOCYTES 1.1 0.1 - 1.3 K/UL  
 ABS. EOSINOPHILS 0.0 0.0 - 0.8 K/UL  
 ABS. BASOPHILS 0.0 0.0 - 0.2 K/UL  
 ABS. IMM. GRANS. 0.1 0.0 - 0.5 K/UL  
 RBC COMMENTS MODERATE 
ANISOCYTOSIS + POIKILOCYTOSIS 
    
 RBC COMMENTS OCCASIONAL 
SCHISTOCYTES 
    
 WBC COMMENTS Result Confirmed By Smear PLATELET COMMENTS ADEQUATE    
 DF AUTOMATED METABOLIC PANEL, COMPREHENSIVE Collection Time: 06/13/21  3:28 PM  
Result Value Ref Range Sodium 137 (L) 138 - 145 mmol/L Potassium 4.0 3.5 - 5.1 mmol/L Chloride 104 98 - 107 mmol/L  
 CO2 25 21 - 32 mmol/L Anion gap 8 7 - 16 mmol/L Glucose 116 (H) 65 - 100 mg/dL BUN 13 8 - 23 MG/DL Creatinine 0.55 (L) 0.8 - 1.5 MG/DL  
 GFR est AA >60 >60 ml/min/1.73m2 GFR est non-AA >60 >60 ml/min/1.73m2 Calcium 8.6 8.3 - 10.4 MG/DL  Bilirubin, total 0.7 0.2 - 1.1 MG/DL  
 ALT (SGPT) 23 12 - 65 U/L  
 AST (SGOT) 26 15 - 37 U/L Alk. phosphatase 233 (H) 50 - 136 U/L Protein, total 7.1 6.3 - 8.2 g/dL Albumin 2.5 (L) 3.2 - 4.6 g/dL Globulin 4.6 (H) 2.3 - 3.5 g/dL A-G Ratio 0.5 (L) 1.2 - 3.5 PROCALCITONIN Collection Time: 06/13/21  3:28 PM  
Result Value Ref Range Procalcitonin 0.39 ng/mL TROPONIN-HIGH SENSITIVITY Collection Time: 06/13/21  3:28 PM  
Result Value Ref Range Troponin-High Sensitivity 16.2 (H) 0 - 14 pg/mL TSH 3RD GENERATION Collection Time: 06/13/21  3:28 PM  
Result Value Ref Range TSH 0.608 0.358 - 3.740 uIU/mL MAGNESIUM Collection Time: 06/13/21  3:28 PM  
Result Value Ref Range Magnesium 2.0 1.8 - 2.4 mg/dL PROTHROMBIN TIME + INR Collection Time: 06/13/21  3:28 PM  
Result Value Ref Range Prothrombin time 15.2 (H) 12.5 - 14.7 sec INR 1.2 PTT Collection Time: 06/13/21  3:28 PM  
Result Value Ref Range aPTT 33.3 24.1 - 35.1 SEC NT-PRO BNP Collection Time: 06/13/21  3:28 PM  
Result Value Ref Range NT pro-BNP 4,875 (H) <450 PG/ML  
BLOOD GAS, ARTERIAL POC Collection Time: 06/13/21  3:35 PM  
Result Value Ref Range Device: NASAL CANNULA pH (POC) 7.43 7.35 - 7.45    
 pCO2 (POC) 36.5 35 - 45 MMHG  
 pO2 (POC) 151 (H) 75 - 100 MMHG  
 HCO3 (POC) 24.1 22 - 26 MMOL/L  
 sO2 (POC) 99.4 (H) 95 - 98 % Base deficit (POC) 0.1 mmol/L Allens test (POC) Positive Site RIGHT RADIAL Specimen type (POC) ARTERIAL Performed by Sylvester   
 CO2, POC 25 (H) 13 - 23 MMOL/L  
 FIO2, L/min 3    
URINALYSIS W/ RFLX MICROSCOPIC Collection Time: 06/13/21  3:52 PM  
Result Value Ref Range Color YELLOW Appearance CLEAR Specific gravity 1.019 1.001 - 1.023    
 pH (UA) 6.0 5.0 - 9.0 Protein TRACE (A) NEG mg/dL Glucose Negative mg/dL Ketone 15 (A) NEG mg/dL Bilirubin Negative NEG Blood TRACE (A) NEG Urobilinogen 1.0 0.2 - 1.0 EU/dL  Nitrites Negative NEG    
 Leukocyte Esterase Negative NEG    
 WBC 0-3 0 /hpf  
 RBC 0-3 0 /hpf Epithelial cells 0 0 /hpf Bacteria 0 0 /hpf Casts 0-3 0 /lpf  
TYPE & SCREEN Collection Time: 06/13/21  4:07 PM  
Result Value Ref Range Crossmatch Expiration 06/16/2021,2359 ABO/Rh(D) O POSITIVE Antibody screen NEG   
SARS-COV-2 Collection Time: 06/13/21  4:44 PM  
Result Value Ref Range SARS-CoV-2 Please find results under separate order COVID-19 RAPID TEST Collection Time: 06/13/21  4:44 PM  
Result Value Ref Range Specimen source Nasopharyngeal    
 COVID-19 rapid test Not detected NOTD Alexander Wilson MD; 6/13/2021 @3:32 PM Voice dictation software was used during the making of this note. This software is not perfect and grammatical and other typographical errors may be present.   This note has not been proofread for errors. 
===================================================================

## 2021-06-13 NOTE — PROGRESS NOTES
Pharmacokinetic Consult to Pharmacist 
 
Laura Perez is a 81 y.o. male being treated for sepsis. Height: 5' 8\" (172.7 cm)  Weight: 72.6 kg (160 lb) Lab Results Component Value Date/Time BUN 13 06/13/2021 03:28 PM  
 Creatinine 0.55 (L) 06/13/2021 03:28 PM  
 WBC 11.9 (H) 06/13/2021 03:28 PM  
 Procalcitonin 0.39 06/13/2021 03:28 PM  
 Lactic acid 1.5 06/13/2021 03:28 PM  
  
Estimated Creatinine Clearance: 76 mL/min (A) (by C-G formula based on SCr of 0.55 mg/dL (L)). CULTURES: 
In process No results found for: [place] Mala Day 1 of vancomycin. Goal trough is 15-20. I will load the patient with 1500 mg once followed by 1000 mg every 18 hours. Pharmacist will continue to follow patient and order levels as clinically indicated. Please call with any questions. Thank you, Dipti Villalobos, Pharm. D.  
PGY1 Pharmacy Resident [phone]

## 2021-06-13 NOTE — H&P
[facility]  History and Physical    Patient ID:  Ariel Guan  male  1935  [id number]    Admission Date: 6/13/2021  Chief Complaint: Unresponsiveness  Reason for Admission: Large acute ischemic stroke, new onset A. fib RVR, hypertensions, fever, hypoxia    ASSESSMENT & PLAN:    Suspected severe sepsis with hypoxiaprocalcitonin greater than 0.25, continue empiric vancomycin and Zosyn's, follow cultures MRSA screen, de-escalate if indicated, imaging pending  COVID-19 screening pending    Acute ischemic stroke with right hemiparalysislarge core infarct throughout the left hemisphere with the entire left MCA and ERI with CT angiogram  -No utility for MRI of the brain,  History of melena, peptic ulcer disease, occult blood pending, antiplatelet therapy contraindicated at this time,  -Allow permissive hypertension , systolic blood pressure up to 210, Occupational Therapy, physical therapy, speech therapy, , carotid ultrasound, echocardiogram n.p.o. for now  Neurology consultation    Suspected melenahistory of iron pills, unclear if active GI bleed, hemoglobin unchanged from prior, follow-up occult blood stool, iron panel,  GI consultation regarding safety of antiplatelet and anticoagulation therapy for stroke in A. fib    Onset A. fibcontinue rate control, consider amiodarone if difficult to control, anticoagulation contraindicated for suspected melena  Cardiology consultation, echocardiogram  TSH within normal ranges    History of CADaspirin contraindicated due to melena, monitor clinically    Acute hypoxic respiratory distresspossible aspiration pneumonia versus pneumonitis, CT chest abdomen pelvis pending    speculated pulmonary nodules on CT head, concerning metastatic diseaseCT chest abdomen pelvis pending    History of cataract left-sided blindness, restless leg syndrome, peptic ulcer disease, chronic anemia,      Disposition: ICU  Diet: N.p.o.  VTE ppx: SCD only  GI ppx: PPI twice daily  CODE STATUS: DNR/DNI per DOM Rogers  Surrogate decision-maker: David Britton    HISTORY OF PRESENT ILLNESS:  Patient is a 81 y.o. male right-handed with medical h/o please CAD, hypertensions, peptic ulcer disease, anemia, has been has progressive generalized weakness and fatigue for the past week, also has diarrhea chronic, was evaluation in the clinic by gastroenterology prior admissions, no recent endoscopy done     who presented to [facility] ED after found by power  in nephew to be unresponsive, sent to emergency room, upon arrival, patient is nonverbal, with right hemiparalysis, hypoxic, CT reveals large ischemic stroke of the left hemisphere. History gathering limited due to stroke above  No Known Allergies    Prior to Admission Medications   Prescriptions Last Dose Informant Patient Reported? Taking? MAGNESIUM PO   Yes No   Sig: Take 1 Tablet by mouth daily. beta-carotene,A,-vits C,E/mins (OCUVITE PO)   Yes No   Sig: Take 1 Tablet by mouth three (3) days a week. clonazePAM (KlonoPIN) 1 mg tablet   Yes No   Sig: Take 0.5-1 Tablets by mouth nightly. Restless legs   ferrous sulfate (Iron) 325 mg (65 mg iron) tablet   Yes No   Sig: Take  by mouth Daily (before breakfast). metoprolol succinate (TOPROL-XL) 100 mg tablet   Yes No   Sig: Take 1 Tablet by mouth every evening.   multivitamin (ONE A DAY) tablet   Yes No   Sig: Take 1 Tab by mouth daily. omeprazole (PRILOSEC) 20 mg capsule   No No   Sig: Take 1 Capsule by mouth two (2) times a day.       Facility-Administered Medications: None       Past Medical History:   Diagnosis Date    Anemia 06/2021    Arrhythmia     Pt states he was once told he had an irregular heart beat several times, denies afib--- 6/6/21 afib on EKG     Blind left eye     Former smoker, stopped smoking in distant past     Quit ~1996-- 0.5 ppd x 12 years    GERD (gastroesophageal reflux disease)     Hypertension     MI, old 2006    states heart cath 2006- previous mi but no stents were required    PUD (peptic ulcer disease) 2010    multiple-- last ulcer 2010 -   surgery/partial gastrectomy in 2010    Restless legs     Swallowing problem 06/10/2021     Past Surgical History:   Procedure Laterality Date    HX CATARACT REMOVAL Right     HX ENDOSCOPY      multiple    HX GASTRECTOMY  2010    removal of small portion of stomach /small intestines     HX RETINAL DETACHMENT REPAIR Left     left eye x2    HX VITRECTOMY Left     air bubble       Social History     Tobacco Use    Smoking status: Former Smoker     Packs/day: 0.50     Years: 12.00     Pack years: 6.00     Quit date:      Years since quittin.4    Smokeless tobacco: Never Used   Substance Use Topics    Alcohol use: Not Currently     Comment: \"occasional\"       FAMILY HISTORY:     Family History   Problem Relation Age of Onset    Hypertension Sister     Heart Disease Mother     Deep Vein Thrombosis Father     Heart Disease Brother        REVIEW OF SYSTEMS:  A 14 point review of systems was taken and pertinent positive as per HPI.       PHYSICAL EXAM:    Visit Vitals  BP (!) 188/80   Pulse (!) 123   Temp (!) 100.8 °F (38.2 °C)   Resp (!) 37   Ht 5' 8\" (1.727 m)   Wt 72.6 kg (160 lb)   SpO2 96%   BMI 24.33 kg/m²       General: No acute distress, HEENT: Pupils equal and reactive to light and accommodation,   Neck: Supple, no lymphadenopathy, no JVD   Lungs: Diffuse rhonchi's and crackles coarse   cardiovascular: systolic murmur, irregular    Abdomen: Soft, nontender, nondistended, normoactive bowel sounds   Extremities: No cyanosis clubbing or edema   Neuro: Nonverbal, right-sided neglect with right hemiparalysis, GCS 10, follows some commands, eyes open spontaneously,  Psych: Normal mood and affect     Intake/Output last 3 shifts:        Labs:  CMP:   Lab Results   Component Value Date/Time     (L) 2021 03:28 PM    K 4.0 2021 03:28 PM     2021 03:28 PM    CO2 25 06/13/2021 03:28 PM    AGAP 8 06/13/2021 03:28 PM     (H) 06/13/2021 03:28 PM    BUN 13 06/13/2021 03:28 PM    CREA 0.55 (L) 06/13/2021 03:28 PM    GFRAA >60 06/13/2021 03:28 PM    GFRNA >60 06/13/2021 03:28 PM    CA 8.6 06/13/2021 03:28 PM    MG 2.0 06/13/2021 03:28 PM    ALB 2.5 (L) 06/13/2021 03:28 PM    TBILI 0.7 06/13/2021 03:28 PM    TP 7.1 06/13/2021 03:28 PM    GLOB 4.6 (H) 06/13/2021 03:28 PM    AGRAT 0.5 (L) 06/13/2021 03:28 PM    ALT 23 06/13/2021 03:28 PM         CBC:    Lab Results   Component Value Date/Time    WBC 11.9 (H) 06/13/2021 03:28 PM    HGB 9.6 (L) 06/13/2021 03:28 PM    HCT 31.7 (L) 06/13/2021 03:28 PM     06/13/2021 03:28 PM       Lab Results   Component Value Date/Time    INR 1.2 06/13/2021 03:28 PM    Prothrombin time 15.2 (H) 06/13/2021 03:28 PM       ABG:    Lab Results   Component Value Date/Time    PHI 7.43 06/13/2021 03:35 PM    PCO2I 36.5 06/13/2021 03:35 PM    PO2I 151 (H) 06/13/2021 03:35 PM    HCO3I 24.1 06/13/2021 03:35 PM           No results found for: CPK, RCK1, RCK2, RCK3, RCK4, CKMB, CKNDX, CKND1, TROPT, TROIQ, BNPP, BNP      Imaging & Other Studies:    XR Results (maximum last 3): Results from Hospital Encounter encounter on 06/13/21    XR CHEST PORT    Narrative  History: Unresponsive with tachypnea    Exam: portable chest    Comparison: None    Findings: There is mild coarsening of the interstitial lung markings without  focal alveolar infiltrate or pleural effusion. The mediastinal contour and  osseous structures are normal.    Impression  s: No acute findings. CT Results (maximum last 3): Results from [place] encounter on 06/13/21    CT PERF W CBF    Narrative  HISTORY:  28-year-old male with altered mental status. EXAM/TECHNIQUE: CT Perfusion Imaging. CT perfusion imaging of the brain was  performed after the administration of intravenous contrast.  Perfusion maps and  perfusion analysis output were generated using the [employer] perfusion processing  software algorithm. Radiation dose reduction techniques were used for this  study: All CT scans performed at this facility use one or all of the following:  Automated exposure control, adjustment of the mA and/or kVp according to  patient's size, iterative reconstruction. COMPARISON: Noncontrast CT head exam on 6/13/2021. FINDINGS:  Viz.ai Output Values:  CBF < 30% volume:  224 ml   (core infarction volume greater than 50 cc  associated with poor outcomes). Tmax > 6 seconds: 260 ml    Tmax/CBF Mismatch Volume: 36 ml    Tmax/CBF Mismatch Ratio: 1.2    Tmax > 10 seconds Volume: 230 ml   (volume greater than 100 mL is associated  with poor outcome)    Hypoperfusion Intensity Ratio (Tmax > 10 seconds/Tmax > 6 seconds): 0.9  (values greater than 0.5 associated with poor outcome)    Impression  Findings consistent with a large perfusion abnormality involving a large core  infarct throughout the left cerebral hemisphere involving essentially the  entirety of the left MCA and ERI territories as well as the left MCA/PCA  watershed territory. This is consistent with the findings of occlusion within the anterior  circulation noted on the CTA head and neck exam.    Findings of large acute left cerebral hemispheric infarct communicated to Dr. Amanda Owens at the time of CT perfusion dictation at 1740 hours. CTA HEAD NECK W WO CONT    Narrative  HISTORY: 81years of age Male with altered mental status. EXAM: CTA HEAD NECK W WO CONT. Radiation reduction dose techniques were used  for the study. Our CT scanner use one or all of the following- Automated  exposure control, adjustment of the mA and/or KV according the patient size,  iterative reconstruction. 3-D multiplanar reformations were performed at the  workstation. All carotid artery stenosis percentages are based upon NASCET  criteria if appropriate. Per the CT technologist, the study was analyzed by the  [address] ai algorithm.  The radiologists have been asked by the hospital  administration to designate when CTA studies are sent to [address] [place], [place] [address] even though this  algorithm is not used by the radiologist for exam interpretation. COMPARISON: CT head and CT perfusion exams on 6/13/2021. Breast are imaging of the head or neck available. FINDINGS:  VASCULAR FINDINGS:  Cervical CTA:  There is a three-vessel branching pattern of the aortic arch. Mild  atherosclerotic disease of the aortic arch. There is an apparent tiny focal  dissection flap near the origin of the brachiocephalic artery. Right subclavian  artery is patent. The left subclavian artery is patent. Right common carotid artery is patent with mild atherosclerotic disease without  significant stenosis. Right external carotid artery is patent. There is diffuse occlusion throughout the cervical right ICA. Left vertebral artery is dominant. There is multifocal severe luminal narrowing  with near occlusion throughout the cervical right vertebral artery due to  noncalcific plaque versus thrombus. Left common carotid artery is patent with mild atherosclerotic disease. Left  external carotid artery is patent. There is diffuse occlusion throughout the cervical left ICA. The cervical left vertebral artery is stenosed proximally but is otherwise  diffusely patent. Cranial CTA:  Intracranial right internal carotid artery is diffusely occluded to the level of  the distal cavernous segment where there is reconstitution to the carotid  terminus. Intracranial left internal carotid artery is diffusely occluded. Right A1 segment is severely stenosed at the origin but is otherwise patent. Right A2 and A3 segments appear grossly patent. Left A1 is diffusely occluded. The left A2 is diffusely occluded. Slight reconstitution within the left A3  segment. Right M1 is patent. Major proximal right MCA branches beyond the bifurcation are  patent.     There is diffuse occlusion throughout the left middle cerebral artery and its  branch vessels. Intracranial right vertebral artery is patent but with multifocal severe  stenosis with near occlusion. Intracranial left vertebral artery is patent. Basilar artery is patent. Right posterior cerebral artery is patent. The left posterior cerebral artery is  patent. No evidence of intracranial aneurysms. Major dural venous sinuses are not  adequately evaluated. NONVASCULAR FINDINGS:  Head:  There is diffusely decreased attenuation throughout the left ERI and MCA  territories consistent with an acute large volume left anterior circulation  infarction. No evidence of intracranial hemorrhage. There are findings of left  orbital scleral banding and postsurgical orbital changes with increased density  throughout the left globe. Findings of right lens replacement. Calvarial and  maxillofacial soft tissues are without evidence of significant acute  abnormality. Neck:  Thyroid gland is without dominant nodules. There is a borderline prominent lymph  node adjacent the dasha as well as some slightly prominent but not enlarged by  size criteria mediastinal lymph nodes. There is some fluid within the medial  left hemithorax adjacent the descending thoracic aorta likely secondary to an  effusion. Apparent small right pleural effusion. There is a spiculated nodular  opacity within the apical left upper lobe measuring 13 mm. There is also a s  spiculated nodule within the medial right upper lobe measuring 10 mm. OSSEOUS STRUCTURES:  Mastoid air cells are well aerated. Paranasal sinuses are without significant  mucoperiosteal thickening. Multilevel degenerative changes of the cervical and  visualized upper thoracic spine. Diffusely decreased bony mineralization. Impression  1.  Findings of diffuse occlusion throughout the left internal carotid artery as  well as occlusion throughout the left middle cerebral artery and the A1 and A2  segments of the left anterior cerebral artery. Findings are consistent with an  acute large volume left anterior circulation infarct as noted on the CT  perfusion exam.    2. Occlusion throughout the cervical and intracranial right internal carotid  artery to the level of the distal cavernous segment with reconstitution distal  to this level. 3. Multifocal severe luminal narrowing but there occlusion throughout the right  vertebral artery. 4. 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 5. Biapical spiculated pulmonary nodules which are concerning for metastatic  lesions. Borderline prominent precarinal lymph node. Fluid adjacent the  descending thoracic aorta likely due to effusion. CT chest examination is  recommended for further evaluation. The findings of left internal carotid artery and intracranial left anterior  circulation occlusion were communicated to Dr. Kennedy [place] at 1740 hours. Emergent  Neurosurgical intervention consultation recommended. CT HEAD WO CONT    Narrative  History: AMS    Exam: CT head without contrast    Technique: Thin section axial CT images were obtained from the skullbase through  the vertex. Radiation dose reduction techniques were used for this study. Our  CT scanners use one or all of the following: Automated exposure control,  adjustment of the mA and/or kV according to patient size, use of iterative  reconstruction. Findings: The ventricles are normal in size, shape, and position. There is  generalized cerebral atrophy with chronic appearing white matter change in the  corona radiata and centrum semiovale. Postsurgical change of the left orbit  noted. . No extra-axial fluid collection is present. There is no mass or  mass-effect. The basilar cisterns are patent. The paranasal sinuses and mastoid  air cells are clear. Impression  Chronic appearing change without acute intracranial abnormality. MRI Results (maximum last 3):   No results found for this or any previous visit.      Nuclear Medicine Results (maximum last 3): No results found for this or any previous visit. US Results (maximum last 3): No results found for this or any previous visit. DEXA Results (maximum last 3): No results found for this or any previous visit. ASA Results (maximum last 3): No results found for this or any previous visit. IR Results (maximum last 3): No results found for this or any previous visit. VAS/US Results (maximum last 3): No results found for this or any previous visit. PET Results (maximum last 3): No results found for this or any previous visit. EKG Results     Procedure 720 Value Units Date/Time    EKG, 12 LEAD, INITIAL [[id number]] Collected: 06/13/21 1519    Order Status: Completed Updated: 06/13/21 1520     Ventricular Rate 110 BPM      Atrial Rate 178 BPM      QRS Duration 92 ms      Q-T Interval 334 ms      QTC Calculation (Bezet) 452 ms      Calculated R Axis 76 degrees      Calculated T Axis 77 degrees      Diagnosis --     !! AGE AND GENDER SPECIFIC ECG ANALYSIS !!   Atrial fibrillation with rapid ventricular response with premature   ventricular or aberrantly conducted complexes  ST & T wave abnormality, consider lateral ischemia or digitalis effect  Abnormal ECG  When compared with ECG of 06-JUN-2021 15:58,  T wave inversion more evident in Lateral leads            Active Problems:  Patient Active Problem List    Diagnosis Date Noted    CVA (cerebral vascular accident) ([facility] [address] 06/13/2021         DO Hunter Gonzales Hospitalist Service  6/13/2021 7:06 PM  '

## 2021-06-14 PROBLEM — Z51.5 HOSPICE CARE PATIENT: Status: ACTIVE | Noted: 2021-01-01

## 2021-06-14 PROBLEM — I69.30 SEQUELAE, POST-STROKE: Status: ACTIVE | Noted: 2021-01-01

## 2021-06-14 NOTE — PROGRESS NOTES
LEAPFROG EVALUATION: [facility] The patient is currently in the ICU for CVA. Management by Dr Curt Izaguirre. Patient is currently hemodynamically stable. Patient has no needs identified for Intensivist management in the ICU setting at this time. Please notify us if can be of assistance. No charge billed to the patient. Thank you.  
 
Donya Boyle NP

## 2021-06-14 NOTE — PROGRESS NOTES
Mr. Scarlett Vaz was admitted to room 108 around [id number]. He was transported from [facility] by EMS. 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 Will continue to assess need for change in LOC and continue to collaborate with IDG team regarding d/c planning. 1738- patient was showing signs of pain and agitation aeb restlessness and pulling down covers. Patient medicated with prn morphine for pain and prn haldol for agitation. flacc 5/10.  
 
1830- patient resting quietly with no more s/sx of pain or agitation.  flacc 0/10 
 
1845- report given to Tay Bustamante

## 2021-06-14 NOTE — H&P
History and Physical    Patient: Livier Sauer MRN: [id number]  SSN: [id number]    YOB: 1935  Age: 81 y.o. Sex: male      Subjective:      Livier Sauer is a 81 y.o. male who has a hospice diagnosis of sequelae of stroke. Patient admitted GIP with sequelae of stroke for management of pain, dyspnea, dysphagia and agitation. Past Medical History:   Diagnosis Date    Anemia 2021    Arrhythmia     Pt states he was once told he had an irregular heart beat several times, denies afib--- 21 afib on EKG     Blind left eye     Former smoker, stopped smoking in distant past     Quit ~-- 0.5 ppd x 12 years    GERD (gastroesophageal reflux disease)     Hypertension     MI, old     [facility] heart cath - previous mi but no stents were required    PUD (peptic ulcer disease)     multiple-- last ulcer  -   surgery/partial gastrectomy in     Restless legs     Swallowing problem 06/10/2021     Past Surgical History:   Procedure Laterality Date    HX CATARACT REMOVAL Right     HX ENDOSCOPY      multiple    HX GASTRECTOMY  2010    removal of small portion of stomach /small intestines     HX RETINAL DETACHMENT REPAIR Left     left eye x2    HX VITRECTOMY Left     air bubble      Family History   Problem Relation Age of Onset    Hypertension Sister     Heart Disease Mother     Deep Vein Thrombosis Father     Heart Disease Brother      Social History     Tobacco Use    Smoking status: Former Smoker     Packs/day: 0.50     Years: 12.00     Pack years: 6.00     Quit date:      Years since quittin.4    Smokeless tobacco: Never Used   Substance Use Topics    Alcohol use: Not Currently     Comment: \"occasional\"        Prior to Admission Medications   Prescriptions Last Dose Informant Patient Reported? Taking? MAGNESIUM PO     Yes No   Sig: Take 1 Tablet by mouth daily.    beta-carotene,A,-vits C,E/mins (OCUVITE PO)     Yes No   Sig: Take 1 Tablet by mouth three (3) days a week. clonazePAM (KlonoPIN) 1 mg tablet     Yes No   Sig: Take 0.5-1 Tablets by mouth nightly. Restless legs   ferrous sulfate (Iron) 325 mg (65 mg iron) tablet     Yes No   Sig: Take  by mouth Daily (before breakfast). metoprolol succinate (TOPROL-XL) 100 mg tablet     Yes No   Sig: Take 1 Tablet by mouth every evening.   multivitamin (ONE A DAY) tablet     Yes No   Sig: Take 1 Tab by mouth daily. omeprazole (PRILOSEC) 20 mg capsule     No No   Sig: Take 1 Capsule by mouth two (2) times a day. No Known Allergies    Review of Systems:  Review of systems not obtained due to patient factors. Objective:     Vitals:    06/14/21 1720   BP: (!) 200/98   Pulse: 96   Resp: 20   Temp: 98.9 °F (37.2 °C)        Physical Exam:  GENERAL: mild distress, appears older than stated age, pale, unresponsive. EYES: Left eye with advanced cataract. LUNG: Coarse breath sounds, diminished in the bases. Labored respirations. HEART: irregularly irregular rhythm  ABDOMEN: soft, non-tender. Bowel sounds hypoactive. : Morgan catheter with benito urine. EXTREMITIES:  extremities with no cyanosis or edema. SKIN: Pale. Hot to touch. Skin intact. NEUROLOGIC: Unresponsive. Generalized weakness. Bedbound. Positive right babinski reflex. Left great toe hyperextended with negative babinski reflex.      Assessment:     Hospital Problems  Date Reviewed: 6/14/2021        Codes Class Noted POA    * (Principal) Sequelae, post-stroke ICD-10-CM: I69.30  ICD-9-CM: 438.9  6/14/2021 Unknown        Hospice care patient ICD-10-CM: Z51.5  ICD-9-CM: V66.7  6/14/2021 Unknown        CVA (cerebral vascular accident) [facility]) ICD-10-CM: I63.9  ICD-9-CM: 434.91  6/13/2021 Yes              Plan:     Current Facility-Administered Medications   Medication Dose Route Frequency    morphine injection 2 mg  2 mg SubCUTAneous Q20MIN PRN    Or    morphine injection 2 mg  2 mg IntraVENous Q20MIN PRN    haloperidol lactate (HALDOL) injection 2 mg  2 mg SubCUTAneous Q1H PRN    Or    haloperidol lactate (HALDOL) injection 2 mg  2 mg IntraVENous Q1H PRN    haloperidol lactate (HALDOL) injection 2 mg  2 mg SubCUTAneous Q1H PRN    Or    haloperidol lactate (HALDOL) injection 2 mg  2 mg IntraVENous Q1H PRN    sodium chloride (NS) flush 3 mL  3 mL IntraVENous Q12H    sodium chloride (NS) flush 3 mL  3 mL IntraVENous PRN    acetaminophen (TYLENOL) suppository 650 mg  650 mg Rectal Q3H PRN    bisacodyL (DULCOLAX) suppository 10 mg  10 mg Rectal PRN    glycopyrrolate (ROBINUL) injection 0.2 mg  0.2 mg IntraVENous Q4H PRN    LORazepam (ATIVAN) injection 2 mg  2 mg IntraVENous Q10MIN PRN       6/14: (SFD) Admitted GIP with sequelae of stroke for management of pain, dyspnea, dysphagia and agitation. 1. Pain: Morphine 2mg IV/SQ Q20 minutes as needed. 2. Dyspnea: Morphine 2mg IV/SQ Q20 minutes as needed. Glycopyrrolate 0.2mg q4 prn secretions. Oxygen at 2 L/min prn.    3. Dysphagia: Diet as tolerated. Keep HOB elevated with po intake. Oral suction as needed. 4. Agitation: Haloperidol 2mg IV/SQ Q1 hour prn.    5. Family/Pt Support: No family at bedside during exam. Medications and plan of care discussed with nursing staff. Will continue to monitor for symptoms and adjust medications as needed to maintain patient comfort. PPS 10 %. Case discussed with Dr. Ike Lerma. Hold meal tray.      Signed By: Regina Florentino NP     Sariah 15, 2021

## 2021-06-14 NOTE — [facility] IDG NURSE NOTES
Patient: Lorenzo Rosado Date: 21 Time: 7:34 PM 
 
[facility] Nurse Notes Mr. Kathya Lowry was admitted to room 108 around [phone]. He was transported from [facility] by EMS. 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 Will continue to assess need for change in LOC and continue to collaborate with IDG team regarding d/c planning. Upon arrival, patient was showing signs of pain and agitation aeb restlessness and pulling down covers. Patient medicated with prn morphine for pain and prn haldol for agitation. Patient nonverbal, but opens eyes spontaneously. Pt presents with gonzalez and two working ivs. His skin is intact and all extremities cold and mottled. Patient is on room air. Bed is low and locked, tab alert in place, and door left open for continuous monitoring. Pt's next of kin is his nephew, Glenn Hess. Mc Hernadez at [phone] and updated him on patient condition. Glenn Hess confirmed [employer] as patient's  home. Admission complete and initial general hospice care plan initiated which includes spiritual, psychosocial ,and bereavement. IDG team, including MD, made aware of plan of care. Signed by: Mitchell Nunn

## 2021-06-14 NOTE — PROGRESS NOTES
1845:  Patient report from Beth Xavier RN. Patient ID by name and . Patient here for [facility] LOC for hospice diagnosis of sequelae of stroke. Patient in bed with eyes closed. No s/sx of pain, dyspnea, or agitation observed. FLACC 0/10. RN has reviewed POC with CNA. Patient will remain in [facility] for [facility] LOC until demise. Will continue to evaluate patient discharge plan for potential change of LOC. Bed low and locked and all safety measures in place. Door open. :  Patient moaning out and moving arms in the air after bath. FLACC 4/10. Medicated with PRN Morphine 2 mg IV for pain and peripheral  IV's flushed. Physical assessment complete and documented in flowsheets. Will reassess pain. All safety measures remain in place. :  Reassessment:  Patient now resting with no s/sx of pain observed. FLACC 0/10. All safety measures remain in place. 2330:  Patient resting with no s/sx of pain, dyspnea, or agitation observed. FLACC 0/10. All safety measures continue. 0130:  Patient resting with no s/sx of pain, dyspnea, or agitation observed. FLACC 0/10. All safety measures continue. 0330:  Patient resting with no s/sx of pain, dyspnea, or agitation observed. FLACC 0/10. All safety measures continue. 0500:  Patient resting with no s/sx of pain, dyspnea, or agitation observed. FLACC 0/10. All safety measures continue. Patient required one PRN dose of Morphine for pain this shift. Report to Yazmin Saeed RN.

## 2021-06-14 NOTE — PROGRESS NOTES
TRANSFER - OUT REPORT: 
 
Verbal report given to Kylee Bernal on [id number] [facility],[place] [address] being transferred to [facility] for routine progression of care Report consisted of patients Situation, Background, Assessment and Recommendations (SBAR). Information from the following report(s) SBAR, Intake/Output, MAR, Recent Results, Med Rec Status and Cardiac Rhythm NSR was reviewed with the receiving nurse. Opportunity for questions and clarification was provided.

## 2021-06-14 NOTE — PROGRESS NOTES
CM made contact with neville Villagomez and discussed the Hospice consult. Nephew states he's agreeable with the consult. Nephew has requested a referral be made to [facility]. CM made contact with Virgilio Sheldon and she will make contact with nephleticia. CM will continue to monitor and remain available for any needs or concerns that may occur. Care Management Interventions PCP Verified by CM: Yes (Jones Kelly MD) Mode of Transport at Discharge: BLS [facility]) Transition of Care Consult (CM Consult): Discharge Planning, Hospice House (Referral made to [facility] ) Discharge Durable Medical Equipment: No 
Physical Therapy Consult: No 
Occupational Therapy Consult: No 
Speech Therapy Consult: No 
Confirm Follow Up Transport: Other (see comment) The Patient and/or Patient Representative was Provided with a Choice of Provider and Agrees with the Discharge Plan?: Yes Name of the Patient Representative Who was Provided with a Choice of Provider and Agrees with the Discharge Plan: neville Freedom of Choice List was Provided with Basic Dialogue that Supports the Patient's Individualized Plan of Care/Goals, Treatment Preferences and Shares the Quality Data Associated with the Providers?: Yes [employer] Information Provided?: No 
Discharge Location Discharge Placement: Other: (Patient will be d/c to [facility])

## 2021-06-14 NOTE — PROGRESS NOTES
TRANSFER - IN REPORT: 
 
Verbal report received from ED RN on [address] [facility] [address]  being received from ED for routine progression of care Report consisted of patients Situation, Background, Assessment and  
Recommendations(SBAR). Information from the following report(s) SBAR, Kardex, ED Summary, Intake/Output, MAR, Recent Results, Med Rec Status, Cardiac Rhythm ST and afib RVR w/ frequent PVCs, Alarm Parameters  and Dual Neuro Assessment was reviewed with the receiving nurse. Opportunity for questions and clarification was provided. Assessment completed upon patients arrival to unit and care assumed. Skin dual assessment completed on transfer in. Skin intact, blanchable redness and discoloration to sacrum. Small abrasion to forehead.  Assessment completed by Paola Villanueva and Willei Juarez, RN

## 2021-06-14 NOTE — DISCHARGE INSTRUCTIONS

## 2021-06-14 NOTE — DISCHARGE SUMMARY
[employer] Hospitalist Discharge Summary     Name:  Blake Nicolas    Age:85 y.o. Sex:male  :  1935       MRN:  [id number]       Admitting Physician: Michelet Luu DO Admit Date: 2021  3:13 PM   Attending Physician: Bebo Jenkins MD  Primary Care Physician: Aminta Crowley MD       Discharge Physician: Malinda Taylor MD  Discharge date: 21   Discharged Condition: Stable    Indication for Admission:   Chief Complaint   Patient presents with    Altered mental status        Reasons for hospitalization:  Hospital Problems as of 2021 Date Reviewed: 2021        Codes Class Noted - Resolved POA    * (Principal) CVA (cerebral vascular accident) [facility]) ICD-10-CM: I63.9  ICD-9-CM: 434.91  2021 - Present Unknown                 Discharge Diagnosis:  CVA          Did Patient have Sepsis (YES OR NO): no       Hospital Course :      Mr. Gerson Tubbs is a 82 yo male PMH of CAD, HTN, PUD, anemia admitted with acute CVA, possible GI bleed, AFIB with RVR and newly noted lung lesions. He was found unresponsive per his nephew Sergio Helms, also POA. CT head shows no acute issue, CTP shows large perfusion abnormality involving large core CVA left cerebral hemisphere of left MCA/ ERI, left MCA/ PCA territories. \"CTA head/neck shows  IMPRESSION  1. Findings of diffuse occlusion throughout the left internal carotid artery as  well as occlusion throughout the left middle cerebral artery and the A1 and A2  segments of the left anterior cerebral artery.  Findings are consistent with an  acute large volume left anterior circulation infarct as noted on the CT  perfusion exam.     2. Occlusion throughout the cervical and intracranial right internal carotid  artery to the level of the distal cavernous segment with reconstitution distal  to this level.     3. Multifocal severe luminal narrowing but there occlusion throughout the right  vertebral artery.     4. 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     5. Biapical spiculated pulmonary nodules which are concerning for metastatic  lesions. Borderline prominent precarinal lymph node. Fluid adjacent the  descending thoracic aorta likely due to effusion. CT chest examination is  recommended for further evaluation. \"     He was admitted to ICU overnight. GI consulted for possible GI bleed  Cardiology consulted for AFIB with RVR, managed currently with IV lopressor. Not an anticoagulation candidate. ECHO pending. Troponin elevated. Neurology consulted for CVA management. He has been on vancomycin and zosyn. UA negative. BC pending. CXR no acute issues. Rapid COVID negative. PCR pending.    CT Chest/ AP ordered to evaluate for malignancy.         Discharge plans to hospice house.             Consults: None     Disposition: Hospice/Medical Facility  Diet: DIET NPO  Code Status: DNR      Discharge Info:     Current Discharge Medication List      STOP taking these medications       MAGNESIUM PO Comments:   Reason for Stopping:         beta-carotene,A,-vits C,E/mins (OCUVITE PO) Comments:   Reason for Stopping:         clonazePAM (KlonoPIN) 1 mg tablet Comments:   Reason for Stopping:         metoprolol succinate (TOPROL-XL) 100 mg tablet Comments:   Reason for Stopping:         ferrous sulfate (Iron) 325 mg (65 mg iron) tablet Comments:   Reason for Stopping:         omeprazole (PRILOSEC) 20 mg capsule Comments:   Reason for Stopping:         multivitamin (ONE A DAY) tablet Comments:   Reason for Stopping:               Medications Discontinued During This Encounter   Medication Reason    vancomycin (VANCOCIN) 1,452 mg in 0.9% sodium chloride 250 mL IVPB REORDER    niCARdipine in Saline (CARDENE) 25 MG/250 mL infusion kit DUPLICATE ORDER    niCARdipine (CARDENE) 25 mg in 0.9% sodium chloride (MBP/ADV) 250 mL infusion     aspirin tablet 325 mg     piperacillin-tazobactam (ZOSYN) 4.5 g in 0.9% sodium chloride (MBP/ADV) 100 mL MBP     piperacillin-tazobactam (ZOSYN) 3.375 g in 0.9% sodium chloride (MBP/ADV) 100 mL MBP     famotidine (PEPCID) tablet 20 mg     vancomycin (VANCOCIN) 1,000 mg in 0.9% sodium chloride 250 mL (VIAL-MATE)     sodium chloride (NS) flush 5-40 mL     atorvastatin (LIPITOR) tablet 40 mg     polyethylene glycol (MIRALAX) packet 17 g     labetaloL (NORMODYNE;TRANDATE) injection 10 mg     piperacillin-tazobactam (ZOSYN) 3.375 g in 0.9% sodium chloride (MBP/ADV) 100 mL MBP     pantoprazole (PROTONIX) 40 mg in 0.9% sodium chloride 10 mL injection     metoprolol (LOPRESSOR) injection 5 mg     tuberculin injection 5 Units          Follow Up Orders: Follow-up Appointments   Procedures    FOLLOW UP VISIT Appointment in: Other (Specify)     Standing Status:   Standing     Number of Occurrences:   1     Order Specific Question:   Appointment in     Answer:    Other (Specify)       Follow-up Information     Follow up With Specialties Details Why Contact Info    Stewart Spain MD Family Medicine   [address] [place] [address]  [phone]              Discharge Exam:    Patient Vitals for the past 24 hrs:   Temp Pulse Resp BP SpO2   06/14/21 0759 100.4 °F (38 °C) (!) 101 22 (!) 194/86 94 %   06/14/21 0600  82 (!) 31 (!) 186/82 97 %   06/14/21 0500  89 28 (!) 185/84 97 %   06/14/21 0400  93 25 (!) 198/104 96 %   06/14/21 0345  96 26 (!) 129/92 98 %   06/14/21 0315 98.3 °F (36.8 °C) 97 26 (!) 176/88 100 %   06/14/21 0300  (!) 101 26 (!) 204/88 99 %   06/14/21 0230  (!) 101 27 (!) 208/84 98 %   06/14/21 0215  100 24 (!) 195/86 100 %   06/14/21 0200  85 25 (!) 194/88 99 %   06/14/21 0145  97 26 (!) 191/86 98 %   06/14/21 0130  96 27 (!) 183/81 98 %   06/14/21 0115  99 27 (!) 140/75 99 %   06/14/21 0100  (!) 106 22 (!) 182/88 97 %   06/14/21 0045  (!) 115 (!) 31 (!) 203/98 98 %   06/14/21 0030  (!) 111 26 (!) 177/90 100 %   06/14/21 0015  (!) 106 (!) 31 (!) 200/93 100 %   06/14/21 0000  (!) 114 (!) 40 (!) 180/89 99 %   06/13/21 2345  (!) 114 28 (!) 202/88 100 %   06/13/21 2330  99 25 (!) 181/80 100 %   06/13/21 2315 98.6 °F (37 °C) (!) 103 (!) 43 (!) 177/89 100 %   06/13/21 2300  (!) 111 26 (!) 183/94 100 %   06/13/21 2245  (!) 104 (!) 31 (!) 172/78 99 %   06/13/21 2230  99 26 (!) 172/77 100 %   06/13/21 2215  91 24 (!) 158/80 100 %   06/13/21 2200  95 26 (!) 165/78 100 %   06/13/21 2145  93 24 (!) 169/85 100 %   06/13/21 2130  90 23 (!) 186/97 100 %   06/13/21 2115  (!) 105 23 (!) 170/90 100 %   06/13/21 2100  (!) 117 (!) 37 (!) 188/88 100 %   06/13/21 2045  (!) 117 25 (!) 182/77 100 %   06/13/21 2030  (!) 105 29 (!) 177/76 100 %   06/13/21 2015  (!) 113 19  100 %   06/13/21 2000  (!) 117 29 (!) 179/111 100 %   06/13/21 1930  (!) 129 30 (!) 174/90 95 %   06/13/21 1915 98.6 °F (37 °C) (!) 121 (!) 33 (!) 156/84 94 %   06/13/21 1912    (!) 187/87 94 %   06/13/21 1908     98 %   06/13/21 1859    (!) 187/87 97 %   06/13/21 1844    (!) 188/80 96 %   06/13/21 1750  (!) 123 (!) 37  99 %   06/13/21 1744    (!) 176/80 99 %   06/13/21 1729    (!) 188/81 98 %   06/13/21 1718    (!) 160/89 99 %   06/13/21 1644    (!) 200/93 98 %   06/13/21 1606    (!) 210/106 100 %   06/13/21 1545    (!) 176/87 99 %   06/13/21 1539    (!) 236/102 100 %   06/13/21 1520 (!) 100.8 °F (38.2 °C) (!) 111 24 (!) 227/141 99 %     Oxygen Therapy  O2 Sat (%): 94 % (06/14/21 0759)  Pulse via Oximetry: 97 beats per minute (06/14/21 0759)  O2 Device: None (Room air) (06/14/21 0715)  O2 Flow Rate (L/min): 3 l/min (06/13/21 1915)    Estimated body mass index is 24.33 kg/m² as calculated from the following:    Height as of this encounter: 5' 8\" (1.727 m). Weight as of this encounter: 72.6 kg (160 lb).       Intake/Output Summary (Last 24 hours) at 6/14/2021 1455  Last data filed at 6/14/2021 0522  Gross per 24 hour   Intake 700 ml   Output 1775 ml   Net -1075 ml       *Note that automatically entered I/Os may not be accurate; dependent on patient compliance with collection and accurate  by assistants. Physical Exam:   Physical Exam:   General:                     Elderly, eyes open, minimally responsive    Lungs:                        CTAB, no wheezing, rhonchi, rales anterior   Cardiac:                      Irregular, no edema    Abdomen:                  Soft, non distended, nontender, +BS, no guarding/rebound  Neuro:             Responds some to touch, moves left upper arm, not verbal  Psychiatric:                Unable to assess, calm        All Labs from Last 24 Hrs:  Recent Results (from the past 24 hour(s))   EKG, 12 LEAD, INITIAL    Collection Time: 06/13/21  3:19 PM   Result Value Ref Range    Ventricular Rate 110 BPM    Atrial Rate 178 BPM    QRS Duration 92 ms    Q-T Interval 334 ms    QTC Calculation (Bezet) 452 ms    Calculated R Axis 76 degrees    Calculated T Axis 77 degrees    Diagnosis       !! AGE AND GENDER SPECIFIC ECG ANALYSIS !!   Atrial fibrillation with rapid ventricular response with premature   ventricular or aberrantly conducted complexes  ST & T wave abnormality, consider lateral ischemia or digitalis effect  Abnormal ECG  When compared with ECG of 06-JUN-2021 15:58,  T wave inversion more evident in Lateral leads  Confirmed by Edgar Fenton MD (), Lavonne Dubin ([id number]) on 6/14/2021 7:07:23 AM     CULTURE, BLOOD    Collection Time: 06/13/21  3:27 PM    Specimen: Blood   Result Value Ref Range    Special Requests: LEFT  Antecubital        Culture result: NO GROWTH AFTER 16 HOURS     CULTURE, BLOOD    Collection Time: 06/13/21  3:28 PM    Specimen: Blood   Result Value Ref Range    Special Requests: RIGHT  Antecubital        Culture result: NO GROWTH AFTER 16 HOURS     LACTIC ACID    Collection Time: 06/13/21  3:28 PM   Result Value Ref Range    Lactic acid 1.5 0.4 - 2.0 MMOL/L   CBC WITH AUTOMATED DIFF    Collection Time: 06/13/21  3:28 PM   Result Value Ref Range    WBC 11.9 (H) 4.3 - 11.1 K/uL    RBC 4.03 (L) 4.23 - 5.6 M/uL    HGB 9.6 (L) 13.6 - 17.2 g/dL    HCT 31.7 (L) 41.1 - 50.3 %    MCV 78.7 (L) 79.6 - 97.8 FL    MCH 23.8 (L) 26.1 - 32.9 PG    MCHC 30.3 (L) 31.4 - 35.0 g/dL    RDW 20.5 (H) 11.9 - 14.6 %    PLATELET 803 680 - 710 K/uL    MPV 9.6 9.4 - 12.3 FL    ABSOLUTE NRBC 0.00 0.0 - 0.2 K/uL    NEUTROPHILS 85 (H) 43 - 78 %    LYMPHOCYTES 5 (L) 13 - 44 %    MONOCYTES 9 4.0 - 12.0 %    EOSINOPHILS 0 (L) 0.5 - 7.8 %    BASOPHILS 0 0.0 - 2.0 %    IMMATURE GRANULOCYTES 1 0.0 - 5.0 %    ABS. NEUTROPHILS 10.1 (H) 1.7 - 8.2 K/UL    ABS. LYMPHOCYTES 0.6 0.5 - 4.6 K/UL    ABS. MONOCYTES 1.1 0.1 - 1.3 K/UL    ABS. EOSINOPHILS 0.0 0.0 - 0.8 K/UL    ABS. BASOPHILS 0.0 0.0 - 0.2 K/UL    ABS. IMM. GRANS. 0.1 0.0 - 0.5 K/UL    RBC COMMENTS MODERATE  ANISOCYTOSIS + POIKILOCYTOSIS        RBC COMMENTS OCCASIONAL  SCHISTOCYTES        WBC COMMENTS Result Confirmed By Smear      PLATELET COMMENTS ADEQUATE      DF AUTOMATED     METABOLIC PANEL, COMPREHENSIVE    Collection Time: 06/13/21  3:28 PM   Result Value Ref Range    Sodium 137 (L) 138 - 145 mmol/L    Potassium 4.0 3.5 - 5.1 mmol/L    Chloride 104 98 - 107 mmol/L    CO2 25 21 - 32 mmol/L    Anion gap 8 7 - 16 mmol/L    Glucose 116 (H) 65 - 100 mg/dL    BUN 13 8 - 23 MG/DL    Creatinine 0.55 (L) 0.8 - 1.5 MG/DL    GFR est AA >60 >60 ml/min/1.73m2    GFR est non-AA >60 >60 ml/min/1.73m2    Calcium 8.6 8.3 - 10.4 MG/DL    Bilirubin, total 0.7 0.2 - 1.1 MG/DL    ALT (SGPT) 23 12 - 65 U/L    AST (SGOT) 26 15 - 37 U/L    Alk.  phosphatase 233 (H) 50 - 136 U/L    Protein, total 7.1 6.3 - 8.2 g/dL    Albumin 2.5 (L) 3.2 - 4.6 g/dL    Globulin 4.6 (H) 2.3 - 3.5 g/dL    A-G Ratio 0.5 (L) 1.2 - 3.5     PROCALCITONIN    Collection Time: 06/13/21  3:28 PM   Result Value Ref Range    Procalcitonin 0.39 ng/mL   TROPONIN-HIGH SENSITIVITY    Collection Time: 06/13/21  3:28 PM   Result Value Ref Range    Troponin-High Sensitivity 16.2 (H) 0 - 14 pg/mL   TSH 3RD GENERATION    Collection Time: 06/13/21  3:28 PM   Result Value Ref Range    TSH 0.608 0.358 - 3.740 uIU/mL   MAGNESIUM    Collection Time: 06/13/21  3:28 PM   Result Value Ref Range    Magnesium 2.0 1.8 - 2.4 mg/dL   PROTHROMBIN TIME + INR    Collection Time: 06/13/21  3:28 PM   Result Value Ref Range    Prothrombin time 15.2 (H) 12.5 - 14.7 sec    INR 1.2     PTT    Collection Time: 06/13/21  3:28 PM   Result Value Ref Range    aPTT 33.3 24.1 - 35.1 SEC   NT-PRO BNP    Collection Time: 06/13/21  3:28 PM   Result Value Ref Range    NT pro-BNP 4,875 (H) <450 PG/ML   BLOOD GAS, ARTERIAL POC    Collection Time: 06/13/21  3:35 PM   Result Value Ref Range    Device: NASAL CANNULA      pH (POC) 7.43 7.35 - 7.45      pCO2 (POC) 36.5 35 - 45 MMHG    pO2 (POC) 151 (H) 75 - 100 MMHG    HCO3 (POC) 24.1 22 - 26 MMOL/L    sO2 (POC) 99.4 (H) 95 - 98 %    Base deficit (POC) 0.1 mmol/L    Allens test (POC) Positive      Site RIGHT RADIAL      Specimen type (POC) ARTERIAL      Performed by Sylvester     CO2, POC 25 (H) 13 - 23 MMOL/L    FIO2, L/min 3     CULTURE, URINE    Collection Time: 06/13/21  3:52 PM    Specimen: Cath Urine   Result Value Ref Range    Special Requests: NO SPECIAL REQUESTS      Culture result:        NO GROWTH AFTER SHORT PERIOD OF INCUBATION. FURTHER RESULTS TO FOLLOW AFTER OVERNIGHT INCUBATION.    URINALYSIS W/ RFLX MICROSCOPIC    Collection Time: 06/13/21  3:52 PM   Result Value Ref Range    Color YELLOW      Appearance CLEAR      Specific gravity 1.019 1.001 - 1.023      pH (UA) 6.0 5.0 - 9.0      Protein TRACE (A) NEG mg/dL    Glucose Negative mg/dL    Ketone 15 (A) NEG mg/dL    Bilirubin Negative NEG      Blood TRACE (A) NEG      Urobilinogen 1.0 0.2 - 1.0 EU/dL    Nitrites Negative NEG      Leukocyte Esterase Negative NEG      WBC 0-3 0 /hpf    RBC 0-3 0 /hpf    Epithelial cells 0 0 /hpf    Bacteria 0 0 /hpf    Casts 0-3 0 /lpf   TYPE & SCREEN    Collection Time: 06/13/21  4:07 PM   Result Value Ref Range    Crossmatch Expiration 06/16/2021,2359     ABO/Rh(D) Matthew Israel POSITIVE     Antibody screen NEG    SARS-COV-2    Collection Time: 06/13/21  4:44 PM   Result Value Ref Range    SARS-CoV-2 Please find results under separate order     COVID-19 RAPID TEST    Collection Time: 06/13/21  4:44 PM   Result Value Ref Range    Specimen source Nasopharyngeal      COVID-19 rapid test Not detected NOTD     SARS-COV-2, PCR    Collection Time: 06/13/21  4:44 PM    Specimen: Nasopharyngeal   Result Value Ref Range    Specimen source Nasopharyngeal      SARS-CoV-2 Not detected NOTD     LACTIC ACID    Collection Time: 06/13/21  5:29 PM   Result Value Ref Range    Lactic acid 1.6 0.4 - 2.0 MMOL/L   TROPONIN-HIGH SENSITIVITY    Collection Time: 06/13/21  5:29 PM   Result Value Ref Range    Troponin-High Sensitivity 17.5 (H) 0 - 14 pg/mL   TROPONIN-HIGH SENSITIVITY    Collection Time: 06/13/21  9:15 PM   Result Value Ref Range    Troponin-High Sensitivity 302.2 (HH) 0 - 14 pg/mL   TROPONIN-HIGH SENSITIVITY    Collection Time: 06/14/21  1:12 AM   Result Value Ref Range    Troponin-High Sensitivity 710.2 (HH) 0 - 14 pg/mL   TRANSFERRIN SATURATION    Collection Time: 06/14/21  5:12 AM   Result Value Ref Range    Iron 13 (L) 35 - 150 ug/dL    TIBC 237 (L) 250 - 450 ug/dL    Transferrin Saturation 5 (L) >20 %   TROPONIN-HIGH SENSITIVITY    Collection Time: 06/14/21  5:12 AM   Result Value Ref Range    Troponin-High Sensitivity 616.5 (HH) 0 - 14 pg/mL   MAGNESIUM    Collection Time: 06/14/21  5:12 AM   Result Value Ref Range    Magnesium 2.2 1.8 - 2.4 mg/dL   RETICULOCYTE COUNT    Collection Time: 06/14/21  5:12 AM   Result Value Ref Range    Reticulocyte count 1.9 0.3 - 2.0 %    Absolute Retic Cnt. 0.0822 0.026 - 0.095 M/ul    Immature Retic Fraction 24.0 (H) 2.3 - 13.4 %    Retic Hgb Conc. 26 (L) 29 - 35 pg   LIPID PANEL    Collection Time: 06/14/21  5:12 AM   Result Value Ref Range    Cholesterol, total 77 <200 MG/DL    Triglyceride 88 35 - 150 MG/DL    HDL Cholesterol 31 (L) 40 - 60 MG/DL    LDL, calculated 28.4 <100 MG/DL    VLDL, calculated 17.6 6.0 - 23.0 MG/DL    CHOL/HDL Ratio 2.5     CBC W/O DIFF    Collection Time: 06/14/21  5:12 AM   Result Value Ref Range    WBC 13.6 (H) 4.3 - 11.1 K/uL    RBC 4.35 4.23 - 5.6 M/uL    HGB 10.4 (L) 13.6 - 17.2 g/dL    HCT 33.8 (L) 41.1 - 50.3 %    MCV 77.7 (L) 79.6 - 97.8 FL    MCH 23.9 (L) 26.1 - 32.9 PG    MCHC 30.8 (L) 31.4 - 35.0 g/dL    RDW 21.1 (H) 11.9 - 14.6 %    PLATELET 365 316 - 389 K/uL    MPV 10.1 9.4 - 12.3 FL    ABSOLUTE NRBC 0.00 0.0 - 0.2 K/uL   METABOLIC PANEL, BASIC    Collection Time: 06/14/21  5:12 AM   Result Value Ref Range    Sodium 139 138 - 145 mmol/L    Potassium 3.8 3.5 - 5.1 mmol/L    Chloride 103 98 - 107 mmol/L    CO2 26 21 - 32 mmol/L    Anion gap 10 7 - 16 mmol/L    Glucose 129 (H) 65 - 100 mg/dL    BUN 14 8 - 23 MG/DL    Creatinine 0.62 (L) 0.8 - 1.5 MG/DL    GFR est AA >60 >60 ml/min/1.73m2    GFR est non-AA >60 >60 ml/min/1.73m2    Calcium 8.7 8.3 - 10.4 MG/DL       All Micro Results     Procedure Component Value Units Date/Time    SARS-COV-2, PCR [[id number]] Collected: 06/13/21 1644    Order Status: Completed Specimen: Nasopharyngeal Updated: 06/14/21 1330     Specimen source Nasopharyngeal        SARS-CoV-2 Not detected        Comment:      The specimen is NEGATIVE for SARS-CoV-2, the novel coronavirus associated with COVID-19. This test has been authorized by the FDA under an Emergency Use Authorization (EUA) for use by authorized laboratories.         Fact sheet for Healthcare Providers: ConventionUpdate.co.nz       Fact sheet for Patients: ConventionUpdate.co.nz       Methodology: RT-PCR         CULTURE, URINE [[id number]] Collected: 06/13/21 1552    Order Status: Completed Specimen: Cath Urine Updated: 06/14/21 0938     Special Requests: NO SPECIAL REQUESTS        Culture result:       NO GROWTH AFTER SHORT PERIOD OF INCUBATION. FURTHER RESULTS TO FOLLOW AFTER OVERNIGHT INCUBATION. BLOOD CULTURE [[id number]] Collected: 06/13/21 1527    Order Status: Completed Specimen: Blood Updated: 06/14/21 0752     Special Requests: --        LEFT  Antecubital       Culture result: NO GROWTH AFTER 16 HOURS       BLOOD CULTURE [[id number]] Collected: 06/13/21 1528    Order Status: Completed Specimen: Blood Updated: 06/14/21 0752     Special Requests: --        RIGHT  Antecubital       Culture result: NO GROWTH AFTER 16 HOURS       MSSA/MRSA SC BY PCR, NASAL SWAB [[id number]]     Order Status: Canceled Specimen: Nasal swab     COVID-19 RAPID TEST [[id number]] Collected: 06/13/21 1644    Order Status: Completed Specimen: Nasopharyngeal Updated: 06/13/21 1723     Specimen source Nasopharyngeal        COVID-19 rapid test Not detected        Comment:      The specimen is NEGATIVE for SARS-CoV-2, the novel coronavirus associated with COVID-19. A negative result does not rule out COVID-19. This test has been authorized by the FDA under an Emergency Use Authorization (EUA) for use by authorized laboratories. Fact sheet for Healthcare Providers: ConventionPerSer Corpdate.co.nz  Fact sheet for Patients: ConventionUpdate.co.nz       Methodology: Isothermal Nucleic Acid Amplification               SARS-CoV-2 Lab Results  \"Novel Coronavirus\" Test: No results found for: COV2NT   \"Emergent Disease\" Test: No results found for: EDPR  \"SARS-COV-2\" Test: No results found for: XGCOVT  Rapid Test:   Lab Results   Component Value Date/Time    COVR Not detected 06/13/2021 04:44 PM            Diagnostic Imaging/Tests:   CT HEAD WO CONT    Result Date: 6/13/2021  History: AMS Exam: CT head without contrast Technique: Thin section axial CT images were obtained from the skullbase through the vertex. Radiation dose reduction techniques were used for this study.   Our CT scanners use one or all of the following: Automated exposure control, adjustment of the mA and/or kV according to patient size, use of iterative reconstruction. Findings: The ventricles are normal in size, shape, and position. There is generalized cerebral atrophy with chronic appearing white matter change in the corona radiata and centrum semiovale. Postsurgical change of the left orbit noted. . No extra-axial fluid collection is present. There is no mass or mass-effect. The basilar cisterns are patent. The paranasal sinuses and mastoid air cells are clear. Chronic appearing change without acute intracranial abnormality. CTA HEAD NECK W WO CONT    Result Date: 6/13/2021  HISTORY: 81years of age Male with altered mental status. EXAM: CTA HEAD NECK W WO CONT. Radiation reduction dose techniques were used for the study. Our CT scanner use one or all of the following- Automated exposure control, adjustment of the mA and/or KV according the patient size, iterative reconstruction. 3-D multiplanar reformations were performed at the workstation. All carotid artery stenosis percentages are based upon NASCET criteria if appropriate. Per the CT technologist, the study was analyzed by the [address] [place] algorithm. The radiologists have been asked by the hospital administration to designate when CTA studies are sent to [address] ai even though this algorithm is not used by the radiologist for exam interpretation. COMPARISON: CT head and CT perfusion exams on 6/13/2021. Breast are imaging of the head or neck available. FINDINGS: VASCULAR FINDINGS: Cervical CTA: There is a three-vessel branching pattern of the aortic arch. Mild atherosclerotic disease of the aortic arch. There is an apparent tiny focal dissection flap near the origin of the brachiocephalic artery. Right subclavian artery is patent. The left subclavian artery is patent. Right common carotid artery is patent with mild atherosclerotic disease without significant stenosis. Right external carotid artery is patent.  There is diffuse occlusion throughout the cervical right ICA. Left vertebral artery is dominant. There is multifocal severe luminal narrowing with near occlusion throughout the cervical right vertebral artery due to noncalcific plaque versus thrombus. Left common carotid artery is patent with mild atherosclerotic disease. Left external carotid artery is patent. There is diffuse occlusion throughout the cervical left ICA. The cervical left vertebral artery is stenosed proximally but is otherwise diffusely patent. Cranial CTA: Intracranial right internal carotid artery is diffusely occluded to the level of the distal cavernous segment where there is reconstitution to the carotid terminus. Intracranial left internal carotid artery is diffusely occluded. Right A1 segment is severely stenosed at the origin but is otherwise patent. Right A2 and A3 segments appear grossly patent. Left A1 is diffusely occluded. The left A2 is diffusely occluded. Slight reconstitution within the left A3 segment. Right M1 is patent. Major proximal right MCA branches beyond the bifurcation are patent. There is diffuse occlusion throughout the left middle cerebral artery and its branch vessels. Intracranial right vertebral artery is patent but with multifocal severe stenosis with near occlusion. Intracranial left vertebral artery is patent. Basilar artery is patent. Right posterior cerebral artery is patent. The left posterior cerebral artery is patent. No evidence of intracranial aneurysms. Major dural venous sinuses are not adequately evaluated. NONVASCULAR FINDINGS: Head: There is diffusely decreased attenuation throughout the left ERI and MCA territories consistent with an acute large volume left anterior circulation infarction. No evidence of intracranial hemorrhage. There are findings of left orbital scleral banding and postsurgical orbital changes with increased density throughout the left globe. Findings of right lens replacement.  Calvarial and maxillofacial soft tissues are without evidence of significant acute abnormality. Neck: Thyroid gland is without dominant nodules. There is a borderline prominent lymph node adjacent the dasha as well as some slightly prominent but not enlarged by size criteria mediastinal lymph nodes. There is some fluid within the medial left hemithorax adjacent the descending thoracic aorta likely secondary to an effusion. Apparent small right pleural effusion. There is a spiculated nodular opacity within the apical left upper lobe measuring 13 mm. There is also a s spiculated nodule within the medial right upper lobe measuring 10 mm. OSSEOUS STRUCTURES: Mastoid air cells are well aerated. Paranasal sinuses are without significant mucoperiosteal thickening. Multilevel degenerative changes of the cervical and visualized upper thoracic spine. Diffusely decreased bony mineralization. 1. 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 2. 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 3. Multifocal severe luminal narrowing but there occlusion throughout the right vertebral artery. 4. 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 5. Biapical spiculated pulmonary nodules which are concerning for metastatic lesions. Borderline prominent precarinal lymph node. Fluid adjacent the descending thoracic aorta likely due to effusion. CT chest examination is recommended for further evaluation. The findings of left internal carotid artery and intracranial left anterior circulation occlusion were communicated to Dr. Nidhi Eden at 1740 hours.  Emergent Neurosurgical intervention consultation recommended. CT PERF W CBF    Result Date: 6/13/2021  HISTORY:  81-year-old male with altered mental status. EXAM/TECHNIQUE: CT Perfusion Imaging. CT perfusion imaging of the brain was performed after the administration of intravenous contrast.  Perfusion maps and perfusion analysis output were generated using the [employer] perfusion processing software algorithm. Radiation dose reduction techniques were used for this study: All CT scans performed at this facility use one or all of the following: Automated exposure control, adjustment of the mA and/or kVp according to patient's size, iterative reconstruction. COMPARISON: Noncontrast CT head exam on 6/13/2021. FINDINGS: [employer] Output Values: CBF < 30% volume:  224 ml   (core infarction volume greater than 50 cc associated with poor outcomes). Tmax > 6 seconds: 260 ml Tmax/CBF Mismatch Volume: 36 ml Tmax/CBF Mismatch Ratio: 1.2 Tmax > 10 seconds Volume: 230 ml   (volume greater than 100 mL is associated with poor outcome) Hypoperfusion Intensity Ratio (Tmax > 10 seconds/Tmax > 6 seconds): 0.9  (values greater than 0.5 associated with poor outcome)     Findings consistent with a large perfusion abnormality involving a large core infarct throughout the left cerebral hemisphere involving essentially the entirety of the left MCA and ERI territories as well as the left MCA/PCA watershed territory. This is consistent with the findings of occlusion within the anterior circulation noted on the CTA head and neck exam. Findings of large acute left cerebral hemispheric infarct communicated to Dr. Kennedy Casselberry at the time of CT perfusion dictation at 1740 hours. XR CHEST PORT    Result Date: 6/13/2021  History: Unresponsive with tachypnea Exam: portable chest Comparison: None Findings: There is mild coarsening of the interstitial lung markings without focal alveolar infiltrate or pleural effusion.  The mediastinal contour and osseous structures are normal. IMPRESSIONs: No acute findings. Echocardiogram/EKG results:  No results found for this visit on 06/13/21. EKG Results     Procedure 720 Value Units Date/Time    EKG, 12 LEAD, INITIAL [[id number]] Collected: 06/13/21 1519    Order Status: Completed Updated: 06/14/21 0707     Ventricular Rate 110 BPM      Atrial Rate 178 BPM      QRS Duration 92 ms      Q-T Interval 334 ms      QTC Calculation (Bezet) 452 ms      Calculated R Axis 76 degrees      Calculated T Axis 77 degrees      Diagnosis --     !! AGE AND GENDER SPECIFIC ECG ANALYSIS !! Atrial fibrillation with rapid ventricular response with premature   ventricular or aberrantly conducted complexes  ST & T wave abnormality, consider lateral ischemia or digitalis effect  Abnormal ECG  When compared with ECG of 06-JUN-2021 15:58,  T wave inversion more evident in Lateral leads  Confirmed by Merline Banana MD (), Tad Standard ([id number]) on 6/14/2021 7:07:23 AM            Results for orders placed or performed during the hospital encounter of 06/13/21   EKG, 12 LEAD, INITIAL   Result Value Ref Range    Ventricular Rate 110 BPM    Atrial Rate 178 BPM    QRS Duration 92 ms    Q-T Interval 334 ms    QTC Calculation (Bezet) 452 ms    Calculated R Axis 76 degrees    Calculated T Axis 77 degrees    Diagnosis       !! AGE AND GENDER SPECIFIC ECG ANALYSIS !! Atrial fibrillation with rapid ventricular response with premature   ventricular or aberrantly conducted complexes  ST & T wave abnormality, consider lateral ischemia or digitalis effect  Abnormal ECG  When compared with ECG of 06-JUN-2021 15:58,  T wave inversion more evident in Lateral leads  Confirmed by Merline Banana MD (), Tad Standard ([id number]) on 6/14/2021 7:07:23 AM         Procedures done this admission:  * No surgery found *        Time spent in patient discharge planning and coordination 40 minutes.       Signed By: Alex Taylor MD   [facility]    June 14, 2021  2:55 PM

## 2021-06-14 NOTE — PROGRESS NOTES
Patient has been accepted and approved at [facility]. Nephew is agreeable to the d/c and transport. Patient will be transported at 3pm.  CM will continue to monitor. Care Management Interventions PCP Verified by CM: Yes (Valerie Bond MD) Mode of Transport at Discharge: BLS [facility]) Transition of Care Consult (CM Consult): Discharge Planning, Hospice House (Referral made to [facility] ) Discharge Durable Medical Equipment: No 
Physical Therapy Consult: No 
Occupational Therapy Consult: No 
Speech Therapy Consult: No 
Confirm Follow Up Transport: Other (see comment) The Patient and/or Patient Representative was Provided with a Choice of Provider and Agrees with the Discharge Plan?: Yes Name of the Patient Representative Who was Provided with a Choice of Provider and Agrees with the Discharge Plan: nephew Freedom of Choice List was Provided with Basic Dialogue that Supports the Patient's Individualized Plan of Care/Goals, Treatment Preferences and Shares the Quality Data Associated with the Providers?: Yes [employer] Information Provided?: No 
Discharge Location Discharge Placement: Other: (Patient will be d/c to [facility])

## 2021-06-14 NOTE — PROGRESS NOTES
Initial visit made to patient and a prayer was provided. A  card was left. Enmanuel Henley, [address] [facility], [facility]

## 2021-06-14 NOTE — PROGRESS NOTES
[facility] Progress Note Name:  Rubin Olmos  Age:85 y.o. Sex:male :  1935 MRN:  [id number] Admit Date:  2021 Reason for Admission: 
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 
I spoke with Geraldean Soulier nephew and POA by phone for 20 minutes regarding goals of care. He states that Mr. Stanton Issa would not want to to have artifical feedings nor be bedbound and dependent on others for his care. He agrees that hospice is suitable at this time. He agrees with comfort care and orders updated. Hospice house consulted. Code: DNR, comfort care Dispo/Discharge Planning: pending Hospital Course/Subjective:  
 
Mr. Stanton Issa is a 82 yo male PMH of CAD, HTN, PUD, anemia admitted with acute CVA, possible GI bleed, AFIB with RVR and newly noted lung lesions. He was found unresponsive per his nephew Geraldean Soulier, also POA. CT head shows no acute issue, CTP shows large perfusion abnormality involving large core CVA left cerebral hemisphere of left MCA/ ERI, left MCA/ PCA territories. \"CTA head/neck shows IMPRESSION 1. Findings of diffuse occlusion throughout the left internal carotid artery as 
well as occlusion throughout the left middle cerebral artery and the A1 and A2 
segments of the left anterior cerebral artery.  Findings are consistent with an 
acute large volume left anterior circulation infarct as noted on the CT 
perfusion exam. 
  
2. Occlusion throughout the cervical and intracranial right internal carotid 
artery to the level of the distal cavernous segment with reconstitution distal 
to this level. 
  
3. Multifocal severe luminal narrowing but there occlusion throughout the right 
vertebral artery. 
  
4. 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 
  
5. Biapical spiculated pulmonary nodules which are concerning for metastatic 
lesions. Borderline prominent precarinal lymph node. Fluid adjacent the 
descending thoracic aorta likely due to effusion. CT chest examination is 
recommended for further evaluation. \" He was admitted to ICU overnight. GI consulted for possible GI bleed Cardiology consulted for AFIB with RVR, managed currently with IV lopressor. Not an anticoagulation candidate. ECHO pending. Troponin elevated. Neurology consulted for CVA management. He has been on vancomycin and zosyn. UA negative. BC pending. CXR no acute issues. Rapid COVID negative. PCR pending. CT Chest/ AP ordered to evaluate for malignancy. Discharge plans pending.  
 
 
  
 
Subjective/24 hr Events (06/14/21): Not possible due to mentation Objective:  
 
Patient Vitals for the past 24 hrs: 
 Temp Pulse Resp BP SpO2  
06/14/21 0600  82 (!) 31 (!) 186/82 97 % 06/14/21 0500  89 28 (!) 185/84 97 % 06/14/21 0400  93 25 (!) 198/104 96 % 06/14/21 0345  96 26 (!) 129/92 98 % 06/14/21 0315 98.3 °F (36.8 °C) 97 26 (!) 176/88 100 % 06/14/21 0300  (!) 101 26 (!) 204/88 99 % 06/14/21 0230  (!) 101 27 (!) 208/84 98 % 06/14/21 0215  100 24 (!) 195/86 100 % 06/14/21 0200  85 25 (!) 194/88 99 % 06/14/21 0145  97 26 (!) 191/86 98 % 06/14/21 0130  96 27 (!) 183/81 98 % 06/14/21 0115  99 27 (!) 140/75 99 % 06/14/21 0100  (!) 106 22 (!) 182/88 97 % 06/14/21 0045  (!) 115 (!) 31 (!) 203/98 98 % 06/14/21 0030  (!) 111 26 (!) 177/90 100 % 06/14/21 0015  (!) 106 (!) 31 (!) 200/93 100 % 06/14/21 0000  (!) 114 (!) 40 (!) 180/89 99 % 06/13/21 2345  (!) 114 28 (!) 202/88 100 % 06/13/21 2330  99 25 (!) 181/80 100 % 06/13/21 2315 98.6 °F (37 °C) (!) 103 (!) 43 (!) 177/89 100 % 06/13/21 2300  (!) 111 26 (!) 183/94 100 % 06/13/21 2245  (!) 104 (!) 31 (!) 172/78 99 % 06/13/21 2230  99 26 (!) 172/77 100 % 06/13/21 2215  91 24 (!) 158/80 100 % 06/13/21 2200  95 26 (!) 165/78 100 % 06/13/21 2145  93 24 (!) 169/85 100 % 06/13/21 2130  90 23 (!) 186/97 100 % 06/13/21 2115  (!) 105 23 (!) 170/90 100 % 06/13/21 2100  (!) 117 (!) 37 (!) 188/88 100 % 06/13/21 2045  (!) 117 25 (!) 182/77 100 % 06/13/21 2030  (!) 105 29 (!) 177/76 100 % 06/13/21 2015  (!) 113 19  100 % 06/13/21 2000  (!) 117 29 (!) 179/111 100 % 06/13/21 1930  (!) 129 30 (!) 174/90 95 % 06/13/21 1915 98.6 °F (37 °C) (!) 121 (!) 33 (!) 156/84 94 % 06/13/21 1912    (!) 187/87 94 % 06/13/21 1908     98 % 06/13/21 1859    (!) 187/87 97 % 06/13/21 1844    (!) 188/80 96 % 06/13/21 1750  (!) 123 (!) 37  99 % 06/13/21 1744    (!) 176/80 99 % 06/13/21 1729    (!) 188/81 98 % 06/13/21 1718    (!) 160/89 99 % 06/13/21 1644    (!) 200/93 98 % 06/13/21 1606    (!) 210/106 100 % 06/13/21 1545    (!) 176/87 99 % 06/13/21 1539    (!) 236/102 100 % 06/13/21 1520 (!) 100.8 °F (38.2 °C) (!) 111 24 (!) 227/141 99 % Oxygen Therapy O2 Sat (%): 97 % (06/14/21 0600) Pulse via Oximetry: 114 beats per minute (06/13/21 1915) O2 Device: None (Room air) (06/14/21 0715) O2 Flow Rate (L/min): 3 l/min (06/13/21 1915) Body mass index is 24.33 kg/m². Physical Exam:  
General:     Elderly, eyes open, minimally responsive Lungs:   CTAB, no wheezing, rhonchi, rales anterior Cardiac:   Irregular, no edema Abdomen:   Soft, non distended, nontender, +BS, no guarding/rebound Neuro:  Responds some to touch, moves left upper arm, not verbal 
Psychiatric:  Unable to assess, calm Data Review: 
I have reviewed all labs, meds, and studies from the last 24 hours: 
 
Labs: 
Recent Results (from the past 24 hour(s)) EKG, 12 LEAD, INITIAL Collection Time: 06/13/21  3:19 PM  
Result Value Ref Range Ventricular Rate 110 BPM  
 Atrial Rate 178 BPM  
 QRS Duration 92 ms Q-T Interval 334 ms  QTC Calculation (Bezet) 45 ms  
 Calculated R Axis 76 degrees Calculated T Axis 77 degrees Diagnosis    
  !! AGE AND GENDER SPECIFIC ECG ANALYSIS !! Atrial fibrillation with rapid ventricular response with premature  
ventricular or aberrantly conducted complexes ST & T wave abnormality, consider lateral ischemia or digitalis effect Abnormal ECG When compared with ECG of 06-JUN-2021 15:58, 
T wave inversion more evident in Lateral leads Confirmed by Dimple Larson MD (), [address] ([id number]) on 6/14/2021 7:07:23 AM 
  
CULTURE, BLOOD Collection Time: 06/13/21  3:27 PM  
 Specimen: Blood Result Value Ref Range Special Requests: LEFT Antecubital 
    
 Culture result: NO GROWTH AFTER 16 HOURS    
CULTURE, BLOOD Collection Time: 06/13/21  3:28 PM  
 Specimen: Blood Result Value Ref Range Special Requests: RIGHT Antecubital 
    
 Culture result: NO GROWTH AFTER 16 HOURS    
LACTIC ACID Collection Time: 06/13/21  3:28 PM  
Result Value Ref Range Lactic acid 1.5 0.4 - 2.0 MMOL/L  
CBC WITH AUTOMATED DIFF Collection Time: 06/13/21  3:28 PM  
Result Value Ref Range WBC 11.9 (H) 4.3 - 11.1 K/uL  
 RBC 4.03 (L) 4.23 - 5.6 M/uL HGB 9.6 (L) 13.6 - 17.2 g/dL HCT 31.7 (L) 41.1 - 50.3 % MCV 78.7 (L) 79.6 - 97.8 FL  
 MCH 23.8 (L) 26.1 - 32.9 PG  
 MCHC 30.3 (L) 31.4 - 35.0 g/dL RDW 20.5 (H) 11.9 - 14.6 % PLATELET 465 941 - 884 K/uL MPV 9.6 9.4 - 12.3 FL ABSOLUTE NRBC 0.00 0.0 - 0.2 K/uL NEUTROPHILS 85 (H) 43 - 78 % LYMPHOCYTES 5 (L) 13 - 44 % MONOCYTES 9 4.0 - 12.0 % EOSINOPHILS 0 (L) 0.5 - 7.8 % BASOPHILS 0 0.0 - 2.0 % IMMATURE GRANULOCYTES 1 0.0 - 5.0 %  
 ABS. NEUTROPHILS 10.1 (H) 1.7 - 8.2 K/UL  
 ABS. LYMPHOCYTES 0.6 0.5 - 4.6 K/UL  
 ABS. MONOCYTES 1.1 0.1 - 1.3 K/UL  
 ABS. EOSINOPHILS 0.0 0.0 - 0.8 K/UL  
 ABS. BASOPHILS 0.0 0.0 - 0.2 K/UL  
 ABS. IMM.  GRANS. 0.1 0.0 - 0.5 K/UL  
 RBC COMMENTS MODERATE 
ANISOCYTOSIS + POIKILOCYTOSIS 
    
 RBC COMMENTS OCCASIONAL 
SCHISTOCYTES 
 WBC COMMENTS Result Confirmed By Smear PLATELET COMMENTS ADEQUATE    
 DF AUTOMATED METABOLIC PANEL, COMPREHENSIVE Collection Time: 06/13/21  3:28 PM  
Result Value Ref Range Sodium 137 (L) 138 - 145 mmol/L Potassium 4.0 3.5 - 5.1 mmol/L Chloride 104 98 - 107 mmol/L  
 CO2 25 21 - 32 mmol/L Anion gap 8 7 - 16 mmol/L Glucose 116 (H) 65 - 100 mg/dL BUN 13 8 - 23 MG/DL Creatinine 0.55 (L) 0.8 - 1.5 MG/DL  
 GFR est AA >60 >60 ml/min/1.73m2 GFR est non-AA >60 >60 ml/min/1.73m2 Calcium 8.6 8.3 - 10.4 MG/DL Bilirubin, total 0.7 0.2 - 1.1 MG/DL  
 ALT (SGPT) 23 12 - 65 U/L  
 AST (SGOT) 26 15 - 37 U/L Alk. phosphatase 233 (H) 50 - 136 U/L Protein, total 7.1 6.3 - 8.2 g/dL Albumin 2.5 (L) 3.2 - 4.6 g/dL Globulin 4.6 (H) 2.3 - 3.5 g/dL A-G Ratio 0.5 (L) 1.2 - 3.5 PROCALCITONIN Collection Time: 06/13/21  3:28 PM  
Result Value Ref Range Procalcitonin 0.39 ng/mL TROPONIN-HIGH SENSITIVITY Collection Time: 06/13/21  3:28 PM  
Result Value Ref Range Troponin-High Sensitivity 16.2 (H) 0 - 14 pg/mL TSH 3RD GENERATION Collection Time: 06/13/21  3:28 PM  
Result Value Ref Range TSH 0.608 0.358 - 3.740 uIU/mL MAGNESIUM Collection Time: 06/13/21  3:28 PM  
Result Value Ref Range Magnesium 2.0 1.8 - 2.4 mg/dL PROTHROMBIN TIME + INR Collection Time: 06/13/21  3:28 PM  
Result Value Ref Range Prothrombin time 15.2 (H) 12.5 - 14.7 sec INR 1.2 PTT Collection Time: 06/13/21  3:28 PM  
Result Value Ref Range aPTT 33.3 24.1 - 35.1 SEC NT-PRO BNP Collection Time: 06/13/21  3:28 PM  
Result Value Ref Range NT pro-BNP 4,875 (H) <450 PG/ML  
BLOOD GAS, ARTERIAL POC Collection Time: 06/13/21  3:35 PM  
Result Value Ref Range Device: NASAL CANNULA pH (POC) 7.43 7.35 - 7.45    
 pCO2 (POC) 36.5 35 - 45 MMHG  
 pO2 (POC) 151 (H) 75 - 100 MMHG  
 HCO3 (POC) 24.1 22 - 26 MMOL/L  
 sO2 (POC) 99.4 (H) 95 - 98 %  Base deficit (POC) 0.1 mmol/L Allens test (POC) Positive Site RIGHT RADIAL Specimen type (POC) ARTERIAL Performed by Sylvester   
 CO2, POC 25 (H) 13 - 23 MMOL/L  
 FIO2, L/min 3    
URINALYSIS W/ RFLX MICROSCOPIC Collection Time: 06/13/21  3:52 PM  
Result Value Ref Range Color YELLOW Appearance CLEAR Specific gravity 1.019 1.001 - 1.023    
 pH (UA) 6.0 5.0 - 9.0 Protein TRACE (A) NEG mg/dL Glucose Negative mg/dL Ketone 15 (A) NEG mg/dL Bilirubin Negative NEG Blood TRACE (A) NEG Urobilinogen 1.0 0.2 - 1.0 EU/dL Nitrites Negative NEG Leukocyte Esterase Negative NEG    
 WBC 0-3 0 /hpf  
 RBC 0-3 0 /hpf Epithelial cells 0 0 /hpf Bacteria 0 0 /hpf Casts 0-3 0 /lpf  
TYPE & SCREEN Collection Time: 06/13/21  4:07 PM  
Result Value Ref Range Crossmatch Expiration 06/16/2021,2359 ABO/Rh(D) O POSITIVE Antibody screen NEG   
SARS-COV-2 Collection Time: 06/13/21  4:44 PM  
Result Value Ref Range SARS-CoV-2 Please find results under separate order COVID-19 RAPID TEST Collection Time: 06/13/21  4:44 PM  
Result Value Ref Range Specimen source Nasopharyngeal    
 COVID-19 rapid test Not detected NOTD LACTIC ACID Collection Time: 06/13/21  5:29 PM  
Result Value Ref Range Lactic acid 1.6 0.4 - 2.0 MMOL/L  
TROPONIN-HIGH SENSITIVITY Collection Time: 06/13/21  5:29 PM  
Result Value Ref Range Troponin-High Sensitivity 17.5 (H) 0 - 14 pg/mL TROPONIN-HIGH SENSITIVITY Collection Time: 06/13/21  9:15 PM  
Result Value Ref Range Troponin-High Sensitivity 302.2 (HH) 0 - 14 pg/mL TROPONIN-HIGH SENSITIVITY Collection Time: 06/14/21  1:12 AM  
Result Value Ref Range Troponin-High Sensitivity 710.2 (HH) 0 - 14 pg/mL TRANSFERRIN SATURATION Collection Time: 06/14/21  5:12 AM  
Result Value Ref Range Iron 13 (L) 35 - 150 ug/dL TIBC 237 (L) 250 - 450 ug/dL  Transferrin Saturation 5 (L) >20 % TROPONIN-HIGH SENSITIVITY Collection Time: 06/14/21  5:12 AM  
Result Value Ref Range Troponin-High Sensitivity 616.5 (HH) 0 - 14 pg/mL MAGNESIUM Collection Time: 06/14/21  5:12 AM  
Result Value Ref Range Magnesium 2.2 1.8 - 2.4 mg/dL RETICULOCYTE COUNT Collection Time: 06/14/21  5:12 AM  
Result Value Ref Range Reticulocyte count 1.9 0.3 - 2.0 % Absolute Retic Cnt. 0.0822 0.026 - 0.095 M/ul Immature Retic Fraction 24.0 (H) 2.3 - 13.4 % Retic Hgb Conc. 26 (L) 29 - 35 pg  
LIPID PANEL Collection Time: 06/14/21  5:12 AM  
Result Value Ref Range Cholesterol, total 77 <200 MG/DL Triglyceride 88 35 - 150 MG/DL  
 HDL Cholesterol 31 (L) 40 - 60 MG/DL  
 LDL, calculated 28.4 <100 MG/DL VLDL, calculated 17.6 6.0 - 23.0 MG/DL  
 CHOL/HDL Ratio 2.5    
CBC W/O DIFF Collection Time: 06/14/21  5:12 AM  
Result Value Ref Range WBC 13.6 (H) 4.3 - 11.1 K/uL  
 RBC 4.35 4.23 - 5.6 M/uL  
 HGB 10.4 (L) 13.6 - 17.2 g/dL HCT 33.8 (L) 41.1 - 50.3 % MCV 77.7 (L) 79.6 - 97.8 FL  
 MCH 23.9 (L) 26.1 - 32.9 PG  
 MCHC 30.8 (L) 31.4 - 35.0 g/dL RDW 21.1 (H) 11.9 - 14.6 % PLATELET 903 011 - 953 K/uL MPV 10.1 9.4 - 12.3 FL ABSOLUTE NRBC 0.00 0.0 - 0.2 K/uL METABOLIC PANEL, BASIC Collection Time: 06/14/21  5:12 AM  
Result Value Ref Range Sodium 139 138 - 145 mmol/L Potassium 3.8 3.5 - 5.1 mmol/L Chloride 103 98 - 107 mmol/L  
 CO2 26 21 - 32 mmol/L Anion gap 10 7 - 16 mmol/L Glucose 129 (H) 65 - 100 mg/dL BUN 14 8 - 23 MG/DL Creatinine 0.62 (L) 0.8 - 1.5 MG/DL  
 GFR est AA >60 >60 ml/min/1.73m2 GFR est non-AA >60 >60 ml/min/1.73m2 Calcium 8.7 8.3 - 10.4 MG/DL All Micro Results Procedure Component Value Units Date/Time BLOOD CULTURE [[id number]] Collected: 06/13/21 1527 Order Status: Completed Specimen: Blood Updated: 06/14/21 7162 Special Requests: --     
  LEFT Antecubital 
  
  Culture result: NO GROWTH AFTER 16 HOURS BLOOD CULTURE [[id number]] Collected: 06/13/21 1528 Order Status: Completed Specimen: Blood Updated: 06/14/21 5852 Special Requests: --     
  RIGHT Antecubital 
  
  Culture result: NO GROWTH AFTER 16 HOURS     
 MSSA/MRSA SC BY PCR, NASAL SWAB [[id number]] Order Status: Canceled Specimen: Nasal swab SARS-COV-2, PCR [[id number]] Collected: 06/13/21 1644 Order Status: Completed Updated: 06/13/21 1724 COVID-19 RAPID TEST [[id number]] Collected: 06/13/21 1644 Order Status: Completed Specimen: Nasopharyngeal Updated: 06/13/21 1723 Specimen source Nasopharyngeal     
  COVID-19 rapid test Not detected Comment:     
The specimen is NEGATIVE for SARS-CoV-2, the novel coronavirus associated with COVID-19. A negative result does not rule out COVID-19. This test has been authorized by the FDA under an Emergency Use Authorization (EUA) for use by authorized laboratories. Fact sheet for Healthcare Providers: ConventionUpdate.co.nz Fact sheet for Patients: ConventionUpdate.co.nz Methodology: Isothermal Nucleic Acid Amplification CULTURE, URINE [[id number]] Collected: 06/13/21 1552 Order Status: Completed Specimen: Cath Urine Updated: 06/13/21 1718 EKG Results Procedure 720 Value Units Date/Time EKG, 12 LEAD, INITIAL [[id number]] Collected: 06/13/21 1519 Order Status: Completed Updated: 06/14/21 7327 Ventricular Rate 110 BPM   
  Atrial Rate 178 BPM   
  QRS Duration 92 ms Q-T Interval 334 ms QTC Calculation (Bezet) 452 ms Calculated R Axis 76 degrees Calculated T Axis 77 degrees Diagnosis --  
  !! AGE AND GENDER SPECIFIC ECG ANALYSIS !! Atrial fibrillation with rapid ventricular response with premature  
ventricular or aberrantly conducted complexes ST & T wave abnormality, consider lateral ischemia or digitalis effect Abnormal ECG When compared with ECG of 06-JUN-2021 15:58, 
T wave inversion more evident in Lateral leads Confirmed by Jennifer Simmons MD (), Fidel Marqeuz ([id number]) on 6/14/2021 7:07:23 AM 
  
  
 
 
Other Studies: 
CT HEAD WO CONT Result Date: 6/13/2021 History: AMS Exam: CT head without contrast Technique: Thin section axial CT images were obtained from the skullbase through the vertex. Radiation dose reduction techniques were used for this study. Our CT scanners use one or all of the following: Automated exposure control, adjustment of the mA and/or kV according to patient size, use of iterative reconstruction. Findings: The ventricles are normal in size, shape, and position. There is generalized cerebral atrophy with chronic appearing white matter change in the corona radiata and centrum semiovale. Postsurgical change of the left orbit noted. . No extra-axial fluid collection is present. There is no mass or mass-effect. The basilar cisterns are patent. The paranasal sinuses and mastoid air cells are clear. Chronic appearing change without acute intracranial abnormality. CTA HEAD NECK W WO CONT Result Date: 6/13/2021 HISTORY: 81years of age Male with altered mental status. EXAM: CTA HEAD NECK W WO CONT. Radiation reduction dose techniques were used for the study. Our CT scanner use one or all of the following- Automated exposure control, adjustment of the mA and/or KV according the patient size, iterative reconstruction. 3-D multiplanar reformations were performed at the workstation. All carotid artery stenosis percentages are based upon NASCET criteria if appropriate. Per the CT technologist, the study was analyzed by the [address] [place] algorithm. The radiologists have been asked by the hospital administration to designate when CTA studies are sent to [address] ai even though this algorithm is not used by the radiologist for exam interpretation. COMPARISON: CT head and CT perfusion exams on 6/13/2021. Breast are imaging of the head or neck available.  FINDINGS: VASCULAR FINDINGS: Cervical CTA: There is a three-vessel branching pattern of the aortic arch. Mild atherosclerotic disease of the aortic arch. There is an apparent tiny focal dissection flap near the origin of the brachiocephalic artery. Right subclavian artery is patent. The left subclavian artery is patent. Right common carotid artery is patent with mild atherosclerotic disease without significant stenosis. Right external carotid artery is patent. There is diffuse occlusion throughout the cervical right ICA. Left vertebral artery is dominant. There is multifocal severe luminal narrowing with near occlusion throughout the cervical right vertebral artery due to noncalcific plaque versus thrombus. Left common carotid artery is patent with mild atherosclerotic disease. Left external carotid artery is patent. There is diffuse occlusion throughout the cervical left ICA. The cervical left vertebral artery is stenosed proximally but is otherwise diffusely patent. Cranial CTA: Intracranial right internal carotid artery is diffusely occluded to the level of the distal cavernous segment where there is reconstitution to the carotid terminus. Intracranial left internal carotid artery is diffusely occluded. Right A1 segment is severely stenosed at the origin but is otherwise patent. Right A2 and A3 segments appear grossly patent. Left A1 is diffusely occluded. The left A2 is diffusely occluded. Slight reconstitution within the left A3 segment. Right M1 is patent. Major proximal right MCA branches beyond the bifurcation are patent. There is diffuse occlusion throughout the left middle cerebral artery and its branch vessels. Intracranial right vertebral artery is patent but with multifocal severe stenosis with near occlusion. Intracranial left vertebral artery is patent. Basilar artery is patent. Right posterior cerebral artery is patent. The left posterior cerebral artery is patent. No evidence of intracranial aneurysms.  Major dural venous sinuses are not adequately evaluated. NONVASCULAR FINDINGS: Head: There is diffusely decreased attenuation throughout the left ERI and MCA territories consistent with an acute large volume left anterior circulation infarction. No evidence of intracranial hemorrhage. There are findings of left orbital scleral banding and postsurgical orbital changes with increased density throughout the left globe. Findings of right lens replacement. Calvarial and maxillofacial soft tissues are without evidence of significant acute abnormality. Neck: Thyroid gland is without dominant nodules. There is a borderline prominent lymph node adjacent the dasha as well as some slightly prominent but not enlarged by size criteria mediastinal lymph nodes. There is some fluid within the medial left hemithorax adjacent the descending thoracic aorta likely secondary to an effusion. Apparent small right pleural effusion. There is a spiculated nodular opacity within the apical left upper lobe measuring 13 mm. There is also a s spiculated nodule within the medial right upper lobe measuring 10 mm. OSSEOUS STRUCTURES: Mastoid air cells are well aerated. Paranasal sinuses are without significant mucoperiosteal thickening. Multilevel degenerative changes of the cervical and visualized upper thoracic spine. Diffusely decreased bony mineralization. 1. 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 2. 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 3. Multifocal severe luminal narrowing but there occlusion throughout the right vertebral artery.  4. 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 5. Biapical spiculated pulmonary nodules which are concerning for metastatic lesions. Borderline prominent precarinal lymph node. Fluid adjacent the descending thoracic aorta likely due to effusion. CT chest examination is recommended for further evaluation. The findings of left internal carotid artery and intracranial left anterior circulation occlusion were communicated to Dr. Tommy Garber at 1740 hours. Emergent Neurosurgical intervention consultation recommended. CT PERF W CBF Result Date: 6/13/2021 HISTORY:  26-year-old male with altered mental status. EXAM/TECHNIQUE: CT Perfusion Imaging. CT perfusion imaging of the brain was performed after the administration of intravenous contrast.  Perfusion maps and perfusion analysis output were generated using the [employer] perfusion processing software algorithm. Radiation dose reduction techniques were used for this study: All CT scans performed at this facility use one or all of the following: Automated exposure control, adjustment of the mA and/or kVp according to patient's size, iterative reconstruction. COMPARISON: Noncontrast CT head exam on 6/13/2021. FINDINGS: [employer] Output Values: CBF < 30% volume:  224 ml   (core infarction volume greater than 50 cc associated with poor outcomes). Tmax > 6 seconds: 260 ml Tmax/CBF Mismatch Volume: 36 ml Tmax/CBF Mismatch Ratio: 1.2 Tmax > 10 seconds Volume: 230 ml   (volume greater than 100 mL is associated with poor outcome) Hypoperfusion Intensity Ratio (Tmax > 10 seconds/Tmax > 6 seconds): 0.9  (values greater than 0.5 associated with poor outcome) Findings consistent with a large perfusion abnormality involving a large core infarct throughout the left cerebral hemisphere involving essentially the entirety of the left MCA and ERI territories as well as the left MCA/PCA watershed territory.  This is consistent with the findings of occlusion within the anterior circulation noted on the CTA head and neck exam. Findings of large acute left cerebral hemispheric infarct communicated to Dr. Ciro Wade at the time of CT perfusion dictation at 1740 hours. XR CHEST PORT Result Date: 6/13/2021 History: Unresponsive with tachypnea Exam: portable chest Comparison: None Findings: There is mild coarsening of the interstitial lung markings without focal alveolar infiltrate or pleural effusion. The mediastinal contour and osseous structures are normal. IMPRESSIONs: No acute findings. Current Meds:  
Current Facility-Administered Medications Medication Dose Route Frequency  tuberculin injection 5 Units  5 Units IntraDERMal ONCE  
 LORazepam (ATIVAN) injection 1 mg  1 mg IntraVENous Q4H PRN  
 morphine injection 2 mg  2 mg IntraVENous Q4H PRN  
 sodium chloride (NS) flush 5-40 mL  5-40 mL IntraVENous PRN Problem List: 
Hospital Problems as of 6/14/2021 Date Reviewed: 6/14/2021 Codes Class Noted - Resolved POA * (Principal) CVA (cerebral vascular accident) ([facility] [address] ICD-10-CM: I63.9 ICD-9-CM: 434.91  6/13/2021 - Present Unknown Part of this note was written by using a voice dictation software and the note has been proof read but may still contain some grammatical/other typographical errors. Signed By: Gissell Herr MD  
[facility]  June 14, 2021  9:15 AM

## 2021-06-14 NOTE — PROGRESS NOTES
Bedside and verbal shift change report received from  Clovis Machado (offgoing nurse). Report included the following information SBAR, ED Summary, Procedure Summary, Intake/Output, MAR, Recent Results, Med Rec Status and Cardiac Rhythm NSR wityh PVC's. Dual skin assessment completed at bedside: (list pertinent skin assessment findings) Dual verification of gtts completed (name of gtts verified): N/A

## 2021-06-15 NOTE — [facility] IDG VOLUNTEER NOTES
Patient: Ariel Guan Date: 06/15/21 Time: 9:47 AM 
 
[facility] Volunteer Notes VC's Initial Hospice House IDG Note: (to be used at first IDG on patient) Volunteer  Hospice Interdisciplinary Plan of Care Review Status Codes I = Initiated   NV= No visits per Infection Control Policy or family request 
  
I.  Volunteer Goal: Hospice house volunteer (s) enhances the quality of remaining life while patient is at the hospice house. Interventions: Windy Cal Windy Cal Lorella Libman Volunteer (s) will provide companionship to the patient and/or family by visiting at the hospice house Windy Cal Windy Cal Lorella Libman Volunteer (s) will provide respite as needed when requested by patient and/or family. Danielle Faustin  Volunteer will provide activities such as music, reading, pet therapy, etc. as requested. Danielle Smiths  Comfort bag delivered. Any other special requests or information regarding volunteer services: No further needs identified at this time. Signed by: Beth Jordan

## 2021-06-15 NOTE — [facility] IDG MASTER NOTE
[address] Date: 06/15/21 Time: 10:53 AM 
 
___________________ Patient: Kita Roque Coverage Information: 
   Payor: [place] Plan: [place] MED[place] PART A AND B Subscriber ID: [id number] Phone Number: MRN: [id number] Current Benefit Period: Benefit Period 1 Start Date: 6/14/2021 End Date: 9/11/2021 Hospice Attending Provider: Mecca Kinney MD 
[address] [place] [id number] Phone: [phone] Fax: [phone] Level of Care: General Inpatient Care 
 
 
___________________ Diagnoses: There were no encounter diagnoses. Current Medications: 
 
Current Facility-Administered Medications:  
  morphine injection 2 mg, 2 mg, SubCUTAneous, Q20MIN PRN **OR** morphine injection 2 mg, 2 mg, IntraVENous, Q20MIN PRN, Twkevin Maynor, NP, 2 mg at 06/15/21 1022 
  haloperidol lactate (HALDOL) injection 2 mg, 2 mg, SubCUTAneous, Q1H PRN **OR** haloperidol lactate (HALDOL) injection 2 mg, 2 mg, IntraVENous, Q1H PRN, Twilla Maynor, NP, 2 mg at 06/15/21 1021 
  haloperidol lactate (HALDOL) injection 2 mg, 2 mg, SubCUTAneous, Q1H PRN **OR** haloperidol lactate (HALDOL) injection 2 mg, 2 mg, IntraVENous, Q1H PRN, Twilla Maynor, NP, 2 mg at 06/14/21 1738   sodium chloride (NS) flush 3 mL, 3 mL, IntraVENous, Q12H, Twilla Maynor, NP, 3 mL at 06/15/21 1022   sodium chloride (NS) flush 3 mL, 3 mL, IntraVENous, PRN, Twilla Maynor, NP 
  acetaminophen (TYLENOL) suppository 650 mg, 650 mg, Rectal, Q3H PRN, Twilla Maynor, NP 
  bisacodyL (DULCOLAX) suppository 10 mg, 10 mg, Rectal, PRN, Twilla Maynor, NP 
  glycopyrrolate (ROBINUL) injection 0.2 mg, 0.2 mg, IntraVENous, Q4H PRN, Bladimir Mcguire, NP 
  LORazepam (ATIVAN) injection 2 mg, 2 mg, IntraVENous, Q10MIN PRN, Bladimir Mcguire NP Orders: 
Orders Placed This Encounter  IP CONSULT TO SPIRITUAL CARE Once on week one, then PRN. For [facility] patients only.  For contracted patients, primary hospice will continue to manage spiritual care needs Once on week one, then PRN. For [facility] patients only. For contracted patients, primary hospice will continue to manage spiritual care needs Standing Status:   Standing Number of Occurrences:   1 Order Specific Question:   Reason for Consult: Answer:   Spiritual crisis intervention or per patient or caregiver request  
 DIET PLEASURE Standing Status:   Standing Number of Occurrences:   1 Order Specific Question:   Likes/Dislikes/Preferences Answer:   hold [address] Standing Status:   Standing Number of Occurrences:   1 [address] Standing Status:   Standing Number of Occurrences:   1  
 NURSING-MISCELLANEOUS: comfort measures Enter comfort measures above. CONTINUOUS Enter comfort measures above. Standing Status:   Standing Number of Occurrences:   1 Order Specific Question:   Description of Order: Answer:   comfort measures  CHAO CATHETER, CARE 1. Chao Catheter care every shift and PRN 2. Notify Physician of Chao Catheter leakage, occlusion, gross adherent sediment or accidental removal 
3. Change Chao 30 days after insertion. 4. May flush catheter prn leakage or gross adherent sediment or mucus. Standing Status:   Standing Number of Occurrences:   1  
 BLADDER CHECKS May scan bladder PRN for urinary retention and or patient discomfort Standing Status:   Standing Number of Occurrences:   1  
 NURSING ASSESSMENT:  SPECIFY Assess for GIP, routine, or respite level of care. Q SHIFT Routine Standing Status:   Standing Number of Occurrences:   1 Order Specific Question:   Please describe the test or procedure you would like to order. Answer:   Assess for GIP, routine, or respite level of care.  PAIN ASSESSMENT Pain and Symptoms: Assess ever 4 hours and PRN, for GIP level of care.  PRN Routine Standing Status:   Standing Number of Occurrences:   1 Order Specific Question:   Please describe the test or procedure you would like to order. Answer:   Pain and Symptoms: Assess ever 4 hours and PRN, for GIP level of care.  BEDREST, COMPLETE Standing Status:   Standing Number of Occurrences:   1  ADVANCE DIET AS TOLERATED (nurse communication) This is a nursing order and not a diet order so must enter a separate diet order for starting diet. 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 Associated: Large Subtotal Left Cortical Stroke, AFib, HTN, Dysphagia, De... 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 Associated: Large Subtotal Left Cortical Stroke, AFib, HTN, Dysphagia, Delirium, Respiratory Failure with Hypoxia, Tachypnea, Non associated: Iron Deficiency Anemia, PUD, CAD Standing Status:   Standing Number of Occurrences:   1 Order Specific Question:   Description of Order: Answer:   admit  DO NOT RESUSCITATE Standing Status:   Standing Number of Occurrences:   1  
 OR Linked Order Group  morphine injection 2 mg  morphine injection 2 mg  OR Linked Order Group  haloperidol lactate (HALDOL) injection 2 mg  haloperidol lactate (HALDOL) injection 2 mg  OR Linked Order Group  haloperidol lactate (HALDOL) injection 2 mg  haloperidol lactate (HALDOL) injection 2 mg  sodium chloride (NS) flush 3 mL  sodium chloride (NS) flush 3 mL  acetaminophen (TYLENOL) suppository 650 mg  
 bisacodyL (DULCOLAX) suppository 10 mg  
 glycopyrrolate (ROBINUL) injection 0.2 mg  
 LORazepam (ATIVAN) injection 2 mg  INITIAL PHYSICIAN ORDER: HOSPICE Level Of Care: General Inpatient; Reason for Admission: 6/14: (SFD) Admitted GIP with sequelae of stroke for management of pain, dyspnea, dysphagia and agitation. Standing Status:   Standing Number of Occurrences:   1 Order Specific Question:   Status Answer:   Hospice Order Specific Question:   Level Of Care Answer:   General Inpatient Order Specific Question:   Reason for Admission Answer:   6/14: (SFD) Admitted GIP with sequelae of stroke for management of pain, dyspnea, dysphagia and agitation. Order Specific Question:   Inpatient Hospitalization Certified Necessary for the Following Reasons Answer:   3. Patient receiving treatment that can only be provided in an inpatient setting (further clarification in H&P documentation) Order Specific Question:   Admitting Diagnosis Answer:   Sequelae, post-stroke [[id number]] Order Specific Question:   Terminal Prognosis Diagnosis(es) Answer:   Sequelae, post-stroke [[id number]] Order Specific Question:   Admitting Physician Answer:   Sinai Hinds Order Specific Question:   Attending Physician Answer:   Sinai Hinds Order Specific Question:   Discharge Plan: Answer: Other (Specify)  IP CONSULT TO SOCIAL WORK Once on week one, then PRN for Psychosocial crisis intervention or per patient or caregiver request. 
For [facility] patients only. For contracted patients, primary hospice will continue to manage social work needs. Standing Status:   Standing Number of Occurrences:   1 Order Specific Question:   Reason for Consult: Answer: Once on week one, then PRN for Psychosocial crisis intervention or per patient or caregiver request.  
 
 
Allergies: 
No Known Allergies Care Plan: Encounter Problems (Active) Problem: Anxiety/Agitation Dates: Start: 06/14/21 Disciplines: Interdisciplinary Goal: Verbalize and demonstrate ability to manage anxiety Dates: Start: 06/14/21   Expected End: 06/17/21 Description: The patient/family/caregiver will verbalize and demonstrate ability to manage the patient's anxiety throughout hospice care. Disciplines: Interdisciplinary Intervention: Assess for anxiety/agitation Dates: Start: 06/14/21 Description: Assess for signs and symptoms of anxiety and agitation. Intervention: Instruction strategies to reduce anxiety/agitation Dates: Start: 06/14/21 Description: Instruct patient/caregiver on strategies to reduce anxiety/agitation. Problem: Breathing Pattern - Ineffective Dates: Start: 06/14/21 Disciplines: Interdisciplinary Goal: *Absence of hypoxia Dates: Start: 06/14/21   Expected End: 06/17/21 Disciplines: Interdisciplinary Intervention: Breathing pattern signs and symptoms assessment (eg: Apnea; bradypnea; pursed-lip; dyspnea; hyperpnea; paradoxical; periodic; hyperventilation; hypoventilation; tachypnea) Dates: Start: 06/14/21 Intervention: Monitor for change in patient condition (eg: Vital signs; hypoxemia; mental status; level of consciousness; skin temperature, color) Dates: Start: 06/14/21 Intervention: Respiratory management (eg: Adequate humidification; early mobilization; perform manual percussion/vibration; postural drainage; suctioning; splinting) Dates: Start: 06/14/21 Intervention: Review for respiratory depression and administer medications as prescribed Dates: Start: 06/14/21 Problem: Falls - Risk of   
 Dates: Start: 06/14/21 Disciplines: Interdisciplinary Goal: *Absence of Falls Dates: Start: 06/14/21   Expected End: 06/17/21  Description: Document Michele [place] Fall Risk and appropriate interventions in the flowsheet. Disciplines: Interdisciplinary Problem: Infection - Risk of, Urinary Catheter-Associated Urinary Tract Infection Dates: Start: 06/15/21 Disciplines: Interdisciplinary Goal: *Absence of infection signs and symptoms Dates: Start: 06/15/21   Expected End: 06/17/21 Disciplines: Interdisciplinary Intervention: Urinary catheter needs assessment (eg: Impaired neurologic status; post void residual; spinal cord injury; urinary outflow obstruction; urinary retention; urinary incontinence; fluid imbalance) Dates: Start: 06/15/21 Intervention: Urine assessment (eg: Clarity; color; odor; volume) Dates: Start: 06/15/21 Intervention: Infection signs and symptoms monitoring - urinary tract (eg: Flank or suprapubic pain; burning; fever; dysuria; frequency; urgency; cloudy/bloody/foul odor urine; confusion/agitation; incontinence) Dates: Start: 06/15/21 Intervention: Lab monitoring (eg: Urinalysis; culture and sensitivity; complete blood count with differential) Dates: Start: 06/15/21 Intervention: Urinary catheter management (eg: Closed urinary catheter system maintenance; urinary catheter patency with free downhill flow; perineal care; monitor intake and output) Dates: Start: 06/15/21 Intervention: Urinary catheter discontinuation Dates: Start: 06/15/21 Description: REMINDER(s): 
Urinary catheter discontinuation promptly as indicated; remind physician to remove at or before day 5. Problem: Nausea/Vomiting (Adult) Dates: Start: 06/14/21 Disciplines: Interdisciplinary Goal: *Absence of nausea/vomiting Dates: Start: 06/14/21   Expected End: 06/17/21 Disciplines: Interdisciplinary  Intervention: Dehydration signs and symptoms (eg: Weight/lab monitoring; vomiting/diarrhea/urine; tenting; mucous membranes; dizziness/lethargy/irritability/confusion; weak pulse; tachycardia; blood pressure; I&O) Dates: Start: 06/14/21 Intervention: Gastrointestinal assessment (eg:  Abdominal appearance; rebound tenderness; bowel control; nutrition tolerance; sclera color; skin color; emesis assessment) Dates: Start: 06/14/21 Intervention: Nutrition Care Process per nutrition screen Dates: Start: 06/14/21 Intervention: Vital signs assessment (eg: Blood pressure; body temperature; pulse rate; respiratory rate; pain) Dates: Start: 06/14/21 Intervention: Aspiration risk - history (eg:  Dysphagia; recurrent respiratory illness; neurologic disease/event; injury/surgery to mouth/throat; congenital anomaly; reflux; elderly) Dates: Start: 06/14/21 Intervention: Aspiration risk - signs and symptoms (eg: Ineffective cough; altered level of consciousness; impaired mobility; drooling; poor dentition; vomiting; slurred speech; prolonged supine) Dates: Start: 06/14/21 Intervention: Enteral tube feeding management (eg: Head of bed elevated 30-45 degrees; small bowel feedings; prokinetics) Dates: Start: 06/14/21 Intervention: Hydration management (eg: Electrolyte balance; IV fluid administration; fluid intake) Dates: Start: 06/14/21 Intervention: Intake and output measurement Dates: Start: 06/14/21 Intervention: Lateral position maintenance Dates: Start: 06/14/21 Intervention: Monitor for change in patient condition (eg: Altered mental status; monitor vital signs; diaphoresis; dizziness; pallor; dyspnea; palpitations) Dates: Start: 06/14/21 Intervention: Medication side-effect assessment Dates: Start: 06/14/21 Intervention: Nonpharmacologic nausea management (eg:  Consistent room temperature; deep breathing; distraction; ice chips; minimal moving; pain management) Dates: Start: 06/14/21 Problem: Pain Dates: Start: 06/14/21 Disciplines: Nurse, Interdisciplinary, RT  
 Goal: *Control of Pain Dates: Start: 06/14/21   Expected End: 06/17/21 Description: Yahaira Reilly will have pain control AEB a FLACC score of 2 or less. Disciplines: Nurse, Interdisciplinary, RT Intervention: Assess pain characteristics (eg: Intensity scale; onset; location; quality; severity; duration; frequency; radiation) Dates: Start: 06/14/21 Intervention: Assess pain management - barriers (eg: Past pain experiences) Dates: Start: 06/14/21 Intervention: Identify pain expectations (eg: Patient's pain goal; somatic experiences; behavioral changes; affect) Dates: Start: 06/14/21 Intervention: Identify pain medication concerns (eg: Cultural considerations; addiction concerns) Dates: Start: 06/14/21 Intervention: Support system identification (eg: Caregiver; community resource; family; friends; [employer]; support group) Dates: Start: 06/14/21 Intervention: Monitor for change in patient condition (eg:  Vital signs changes; changes in level of consciousness; nausea; behavioral changes) Dates: Start: 06/14/21 Intervention: Medication side-effect assessment Dates: Start: 06/14/21 Intervention: Pain-relief response reassessment (eg: Frequency based on route of administration; effectiveness) Dates: Start: 06/14/21 Goal: *PALLIATIVE CARE:  Alleviation of Pain Dates: Start: 06/14/21 Disciplines: Nurse, Interdisciplinary, RT Intervention: Refer patient for holistic services per facility Dates: Start: 06/14/21 Problem: Patient Education:  Go to Education Activity Dates: Start: 06/14/21 Disciplines: Interdisciplinary Goal: Patient/Family Education Dates: Start: 06/14/21 Disciplines: Interdisciplinary  Problem: Patient Education: Go to Patient Education Activity Dates: Start: 06/14/21 Disciplines: Interdisciplinary Goal: Patient/Family Education Dates: Start: 06/14/21 Disciplines: Interdisciplinary Problem: Patient Education: Go to Patient Education Activity Dates: Start: 06/14/21 Disciplines: Interdisciplinary Goal: Patient/Family Education Dates: Start: 06/14/21 Disciplines: Interdisciplinary Problem: Pressure Injury - Risk of   
 Dates: Start: 06/14/21 Disciplines: Interdisciplinary Goal: *Prevention of pressure injury Dates: Start: 06/14/21   Expected End: 06/17/21 Description: Document Edd Scale and appropriate interventions in the flowsheet. Disciplines: Interdisciplinary Problem: Risk for Spread of Infection Dates: Start: 06/14/21 Disciplines: Interdisciplinary Goal: Prevent transmission of infectious organism to others Dates: Start: 06/14/21 Description: Prevent the transmission of infectious organisms to other patients, staff members, and visitors. Disciplines: Interdisciplinary Intervention: Verify correct isolation order has been placed for confirmed or suspected infection Dates: Start: 06/14/21 Intervention: Proper isolation precautions placed on patient door Dates: Start: 06/14/21 Intervention: Proper PPE utilized by all who enter patient's room Dates: Start: 06/14/21 Intervention: Proper hand hygiene Dates: Start: 06/14/21 Description: Use alcohol based hand  or soap and water. Intervention: Proper decontamination of surfaces and shared equipment/devices Dates: Start: 06/14/21 Care Plan Problems/Goals Progressing Towards Goal (6) *Absence of Falls (Falls - Risk of) Disciplines:  Interdisciplinary Expected end:  06/17/21 Outcome: Progressing Towards Goal By Sebastian Frank RN on 06/15/21 0816  *Prevention of pressure injury (Pressure Injury - Risk of) Disciplines:  Interdisciplinary Expected end:  06/17/21 Outcome: Progressing Towards Goal By Mirtha Williamson RN on 06/15/21 0326 *Control of Pain (Pain) Disciplines:  Nurse, Interdisciplinary, RT Expected end:  06/17/21 Outcome: Progressing Towards Goal By Mirtha Williamson RN on 06/15/21 7239 Verbalize and demonstrate ability to manage anxiety (Anxiety/Agitation) Disciplines:  Interdisciplinary Expected end:  06/17/21 Outcome: Progressing Towards Goal By Mirtha Williamson RN on 06/15/21 2794 *Absence of hypoxia (Breathing Pattern - Ineffective) Disciplines:  Interdisciplinary Expected end:  06/17/21 Outcome: Progressing Towards Goal By Mirtha Williamson RN on 06/15/21 8808 *Absence of infection signs and symptoms (Infection - Risk of, Urinary Catheter-Associated Urinary Tract Infection) Disciplines:  Interdisciplinary Expected end:  06/17/21 Outcome: Progressing Towards Goal By Mirtha Williamson RN on 06/15/21 4942 No Outcome (6) Prevent transmission of infectious organism to others (Risk for Spread of Infection) Disciplines:  Interdisciplinary Expected end:  -   
  
 
 Patient/Family Education (Patient Education:  Go to Education Activity) Disciplines:  Interdisciplinary Expected end:  -   
  
 
 Patient/Family Education (Patient Education: Go to Patient Education Activity) Disciplines:  Interdisciplinary Expected end:  -   
  
 
 Patient/Family Education (Patient Education: Go to Patient Education Activity) Disciplines:  Interdisciplinary Expected end:  - *PALLIATIVE CARE:  Alleviation of Pain (Pain) Disciplines:  Nurse, Interdisciplinary, RT Expected end:  -   
  
 
 *Absence of nausea/vomiting (Nausea/Vomiting (Adult)) Disciplines:  Interdisciplinary Expected end:  06/17/21 ___________________ Care Team Notes POC/IDG Notes [address] IDG Nurse Notes by Pavan Blackburn RN at 06/15/21 1053  Version 1 of 1 Author: Pavan Blackburn RN Service: NURSING Author Type: Registered Nurse Filed: 06/15/21 1053 Date of Service: 06/15/21 1053 Status: Signed : Pavan Blackburn RN (Registered Nurse) Patient: Mirela Dose Date: 06/15/21 Time: 10:53 AM 
 
[facility] Nurse Notes 1st IDG: Pt is a 81year-old Male with Sequalae of Stroke who is here GIP level of care for management of pain and agitation. Morgan with UOP of 350ml IV access: PIV LAC & RAC 
 PO intake: NPO Oxygen: 2/L PRN Wounds: None PRN medications: Haldol 2mg x 1 dose for agitation / Morphine 2mg x 2 doses for pain Scheduled meds:  None Plan:  Comprehensive plan of care reviewed. IDG and pt./family in agreement with plan of care. The IDG identifies through on-going assessment when a change is needed to the POC; the pt/family will receive care and services necessitated by changes in POC. Medications reviewed by the pharmacist and Medical Director. Signed by: Arlen Navarro RN 
 
  
[address] IDG Volunteer Notes by Jeanette Hutchins at 06/15/21 9938  Version 1 of 1 Author: Jeanette Hutchins Service: Hospice and Palliative Care Author Type: Hospice Volunteer/ Filed: 06/15/21 0953 Date of Service: 06/15/21 0947 Status: Signed : Jeanette Hutchins [facility] Volunteer/) Patient: Mirela Dose Date: 06/15/21 Time: 9:47 AM 
 
[facility] Volunteer Notes VC's Initial Hospice House IDG Note: (to be used at first IDG on patient) Volunteer  Hospice Interdisciplinary Plan of Care Review Status Codes I = Initiated   NV= No visits per Infection Control Policy or family request 
  
I.  Volunteer Goal: Hospice house volunteer (s) enhances the quality of remaining life while patient is at the hospice house. Interventions: ..Tiffany Poon Volunteer (s) will provide companionship to the patient and/or family by visiting at the hospice house Ana Hopper Tiffany Daymikel Volunteer (s) will provide respite as needed when requested by patient and/or family. Nicholes Coca Glena Shake  Volunteer will provide activities such as music, reading, pet therapy, etc. as requested. Nicholes Coca Glena Shake  Comfort bag delivered. Any other special requests or information regarding volunteer services: No further needs identified at this time. Signed by: Sandie Herrera [address] [facility]  Notes by Gayle Lei LMSW at 06/15/21 5082  Version 1 of 1 Author: Gayle Lei LMSW Service: Mando Delgado Author Type: Licensed Masters in Social Work Filed: 06/15/21 0948 Date of Service: 06/15/21 0948 Status: Signed : Alex Lock [facility] (Licensed Masters in Social Work) Patient: Kei Frye Date: 06/15/21 Time: 9:48 AM 
 
[facility]  Notes SW has read the initial comprehensive assessment and plan of care. No immediate needs noted. Initial  assessment visit will be completed within 5 days of admission. Signed by: Silke Tinsley LMSW [facility]  Notes by Parisa Perez at 06/15/21 2917  Version 1 of 1 Author: Parisa Perez Service: Spiritual Care Author Type: Pastoral Care Filed: 06/15/21 0943 Date of Service: 06/15/21 0942 Status: Signed : Parisa Perez (Pastoral Care) Patient: Kei Frye Date: 06/15/21 Time: 9:42 AM 
 
[facility]  Notes / Grief Counselor has reviewed  Initial Comprehensive Assessment and plan of care. Bereavement and Spiritual Care Assessments to be completed and plan of care put in place to meet the needs. Signed by: Joselin Mendiola [address] [facility] Nurse Notes by Maggy Ayala at 06/14/21 1934  Version 1 of 1 Author: Maggy Ayala Service: Mando Delgado Author Type: Registered Nurse  Filed: 06/14/21 1938 Date of Service: 06/14/21 1934 Status: Signed : Guero Bose (Registered Nurse) Patient: Otis Fernandez Date: 21 Time: 7:34 PM 
 
[facility] Nurse Notes Mr. Danyell Hernandez was admitted to room 108 around .[phone]. He was transported from [facility] by EMS. 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 Will continue to assess need for change in LOC and continue to collaborate with IDG team regarding d/c planning. Upon arrival, patient was showing signs of pain and agitation aeb restlessness and pulling down covers. Patient medicated with prn morphine for pain and prn haldol for agitation. Patient nonverbal, but opens eyes spontaneously. Pt presents with gonzalez and two working ivs. His skin is intact and all extremities cold and mottled. Patient is on room air. Bed is low and locked, tab alert in place, and door left open for continuous monitoring. Pt's next of kin is his nephew, Laura Lopez. Isaías Melendez at 48 and updated him on patient condition. Laura Lopez confirmed [employer] as patient's  home. Admission complete and initial general hospice care plan initiated which includes spiritual, psychosocial ,and bereavement. IDG team, including MD, made aware of plan of care. Signed by: [facility] Care Team Present:  
Team Members Present: Physician, Nurse, , , Vol Coordinator, Other (Comment) Physician Team Member: Dr. Gucci Trevizo Nurse Team Member: Rene Gaffney Social Work Team Member: Doron Rojas  Team Member: Arian Lund Vol Coordinator: Lowell Michelle Other Discipline Present (Name):  Andrew Chambers NP

## 2021-06-15 NOTE — [facility] IDG NURSE NOTES
Patient: Esperanza Aquino Date: 06/15/21 Time: 10:53 AM 
 
[facility] Nurse Notes 1st IDG: Pt is a 81year-old Male with Sequalae of Stroke who is here GIP level of care for management of pain and agitation. Morgan with UOP of 350ml IV access: PIV LAC & RAC 
 PO intake: NPO Oxygen: 2/L PRN Wounds: None PRN medications: Haldol 2mg x 1 dose for agitation / Morphine 2mg x 2 doses for pain Scheduled meds:  None Plan:  Comprehensive plan of care reviewed. IDG and pt./family in agreement with plan of care. The IDG identifies through on-going assessment when a change is needed to the POC; the pt/family will receive care and services necessitated by changes in POC. Medications reviewed by the pharmacist and Medical Director.  
 
 
 
 
Signed by: Lavell Mari RN

## 2021-06-15 NOTE — PROGRESS NOTES
8639- Patient report taken from Jose Martinez RN. Walking rounds completed. Patient identified by name and . Patient is GIP with diagnosis of sequelae of stroke. We are treating symptoms of pain and dyspnea and agitation. The patient's discharge plan is to remain at the [facility] under the care of [id number] [address] Diane till he meets his demise. The patient is resting quietly in the bed and show no signs of distress. Pain is at 0/10 using non verbal scale. No signs of anxiety/agitation, nausea/ vomit or SOB. The patient's bed is low and locked with tab alert in place and two bed rails up for safety. 0830- Patient continues to rest with no signs of distress observed. Respirations at 12 but very shallow. No signs of pain, 0/10 using non verbal scale. No signs of agitation / anxiety or N/V observed. Mouth care done. Morgan care done dorene area cleaned and creamed. Patient repositioned to the right side. 1021- Haldol 2 mg IV for agitation and anxiety and Morphine 2 mg IV for dyspnea. Line flushed with 3 ml of normal saline. Respirations at 24.  
 
1100- Patient is resting with eyes closed. No dyspnea. Respirations at 14 minute. Pain is at 0/10 using non verbal scale. No signs of N/V. Mouth care done. 1230- Patient repositioned to the left side. No signs of distress. Pain is at 0/10 using nonverbal scale. No signs of anxiety, SOB or N/V. Dorene area cleaned and creamed. New pad placed. 1430- Patient continues to rest with no signs of distress. Patient has family in to visit. Pranav Mi, the nephew, and a lady. Updated Pranav Mi on the patient's condition and on medications administered. Educated that when the patient reaches his demise staff will call him night or day. Also educated that he does not have to come in at the time of death and that the staff will make the call to the mortuary. Pranav Mi voiced understanding. [id number]- Patient with dyspnea and agitation at turning.  Medicated with Morphine 2 mg IV for dyspnea and with Haldol 2 mg IV for agitation/ anxiety. Patient is repositioned to supine and floated on pillows. 1700- Patient with respirations at 14 minute and unlabored. No signs of anxiety. Patient is resting quietly in the bed.  
 
1900- Respirations at 12 minute and shallow. No signs of anxiety, SOB or nausea. 2200- Patient continues to rest quietly in the bed. No signs of distress. Reported off to Wai Tran RN.

## 2021-06-15 NOTE — [facility] IDG CHAPLAIN NOTES
Patient: Markus Herrera Date: 06/15/21 Time: 9:42 AM 
 
[facility]  Notes / Grief Counselor has reviewed  Initial Comprehensive Assessment and plan of care. Bereavement and Spiritual Care Assessments to be completed and plan of care put in place to meet the needs. Signed by: Tommy Denny

## 2021-06-15 NOTE — ADVANCED PRACTICE NURSE
Test note IDG NOTE Subjective: test 
 
Intake: 
 
Scheduled medications: 
Current Facility-Administered Medications Medication Dose Route Frequency  sodium chloride (NS) flush 3 mL  3 mL IntraVENous Q12H  
 
 
PRN medications/symptoms: 
Medications Administered   
 haloperidol lactate (HALDOL) injection 2 mg Admin Date 
06/14/2021 Action Given Dose 
2 mg Route IntraVENous Administered By 
Sherly Raymond Admin Date 
06/15/2021 Action Given Dose 
2 mg Route IntraVENous Administered By 
Benitez Hammer RN  
  
  
 morphine injection 2 mg Admin Date 
06/14/2021 Action Given Dose 
2 mg Route IntraVENous Administered By 
Sherlyofelia Raymond Admin Date 
06/14/2021 Action Given Dose 
2 mg Route IntraVENous Administered By Robert Gil RN Admin Date 
06/15/2021 Action Given Dose 
2 mg Route IntraVENous Administered By 
Benitez Hammer RN  
  
  
 sodium chloride (NS) flush 3 mL Admin Date 
06/14/2021 Action Given Dose 3 mL Route IntraVENous Administered By Robert Gil RN Admin Date 
06/15/2021 Action Given Dose 3 mL Route IntraVENous Administered By 
Benitez Hammer RN Wounds: Output: Morgan catheter   ml urine output/incontinent x IV access: Peripheral IV Oxygen:  Room air Patient Vitals for the past 24 hrs: 
 Temp Pulse Resp BP  
06/15/21 0800 99.9 °F (37.7 °C) (!) 111 24 (!) 175/93  
06/14/21 1720 98.9 °F (37.2 °C) 96 20 (!) 200/98 Changes in plan of care: Add fentanyl patch. New patient: Comprehensive plan of care reviewed. IDG and pt./family in agreement with plan of care. The IDG identifies through on-going assessment when a change is needed to the POC; the pt/family will receive care and services necessitated by changes in POC. Medications reviewed by the pharmacist and Medical Director. Serena BATISTA, NP-C 
06/15/21

## 2021-06-15 NOTE — PROGRESS NOTES
Problem: Falls - Risk of 
Goal: *Absence of Falls Description: Document Onalee Gum Fall Risk and appropriate interventions in the flowsheet. Outcome: Progressing Towards Goal 
Note: Fall Risk Interventions: 
  
 
Mentation Interventions: Adequate sleep, hydration, pain control, Bed/chair exit alarm, Door open when patient unattended, Evaluate medications/consider consulting pharmacy Medication Interventions: Bed/chair exit alarm, Evaluate medications/consider consulting pharmacy Elimination Interventions: Bed/chair exit alarm, Toileting schedule/hourly rounds History of Falls Interventions: Bed/chair exit alarm, Door open when patient unattended, Evaluate medications/consider consulting pharmacy Problem: Pressure Injury - Risk of 
Goal: *Prevention of pressure injury Description: Document Edd Scale and appropriate interventions in the flowsheet. Outcome: Progressing Towards Goal 
Note: Pressure Injury Interventions: 
Sensory Interventions: Assess changes in LOC, Avoid rigorous massage over bony prominences, Check visual cues for pain, Float heels Moisture Interventions: Absorbent underpads, Apply protective barrier, creams and emollients, Limit adult briefs, Minimize layers, Moisture barrier Activity Interventions: Assess need for specialty bed Mobility Interventions: Assess need for specialty bed, HOB 30 degrees or less, Pressure redistribution bed/mattress (bed type) Nutrition Interventions: Document food/fluid/supplement intake Friction and Shear Interventions: Apply protective barrier, creams and emollients, Lift sheet, Minimize layers Problem: Pain Goal: *Control of Pain Description: Shy Yu will have pain control AEB a FLACC score of 2 or less. Outcome: Progressing Towards Goal 
  
Problem: Anxiety/Agitation Goal: Verbalize and demonstrate ability to manage anxiety Description: The patient/family/caregiver will verbalize and demonstrate ability to manage the patient's anxiety throughout hospice care. Outcome: Progressing Towards Goal 
  
Problem: Breathing Pattern - Ineffective Goal: *Absence of hypoxia Outcome: Progressing Towards Goal

## 2021-06-16 NOTE — PROGRESS NOTES
Demographics Information provided by: Rosalba Wilson- pt's nephew. Name:  Katey Matt Level of Care  GIP [x] Routine  [] Respite   [] From the in home program Yes [] No [x] Diagnosis: Sequelae, post-stroke Insurance    Medicare [x]   Medicaid  []  [employer]  []      Other [] Social  
 []   Single []     []    [x] Children: Unknown at this time. Community Resources Used in the Home prior to admission: Yes []  No [x] Community Resources Needed? Yes []  No [x] If yes, explain:  
  
  
Financial Concerns: Verna Gatica reported no financial concerns at this time. SW will continue to assess for this need. Charla Application Needed   Yes []  No [x] Medicaid application needed Yes []  No [x] IFA Form Complete  Yes [x]   No [] Discharge Plans: Plans are to remain [facility] for GIP and comfort care. Work History : 
Retired [x] [employer] [] Part-time [] Disabled []  Service    Yes [x]  No [] Last Guide []   [employer] [] [employer] [x] [employer] []  Affiliated [employer] []  [employer] [] Linked to [place]   Yes []  No [] **Unknown-SW will work to gather this information from pt's family. Referral made to [place] resources Yes [] No [x] Advanced Directives Scanned in the system Living Will  Yes  []  No [x] HCPOA     Yes  []  No [x] DPOA        Yes  []  No [x] DNR           Yes [x]  No [] Spiritual / Sabianist Support: Unknown at this time. Final Arrangements: Verna Gatica reported that the family will be using [facility]. He reported that everything has been chosen by pt and done except the casket and the family will be working on this.   
  
  
Medical History and/or Narrative copied from the patients chart from the Admitting Physician or Rn: 
[facility] Nurse Notes- 
Mr. Daryl Daniel was admitted to room 108 around [phone]. He was transported from [facility] by EMS. 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 Will continue to assess need for change in LOC and continue to collaborate with IDG team regarding d/c planning.   
  
Upon arrival, patient was showing signs of pain and agitation aeb restlessness and pulling down covers. Patient medicated with prn morphine for pain and prn haldol for agitation. Patient nonverbal, but opens eyes spontaneously. Pt presents with gonzalez and two working ivs. His skin is intact and all extremities cold and mottled. Patient is on room air. Bed is low and locked, tab alert in place, and door left open for continuous monitoring.  
  
Pt's next of kin is his nephew, Judit James. Barb Wilsoner at [phone] and updated him on patient condition. Judit James confirmed [employer] as patient's  home. 
  
Admission complete and initial general hospice care plan initiated which includes spiritual, psychosocial ,and bereavement. IDG team, including MD, made aware of plan of care. Mental Health History: No mental health history reported. Volunteer discussion: Yes []    No [x] **Due to COVID-19 protocols. Goals of care for the patient and family: To keep pt comfortable and to meet pt and family needs. Coping and Bereavement: Judit James reports that the family is coping well, there are no immediate needs. SW will continue to assess and support weekly. Was a Referral made to Bereavement  Yes [] No  [x] Support Needs: Judit James reported that everyone at [facility] has been comforting and nice. He expressed a great deal of thankfulness for the staff at [facility].  He reported that his wife passed away here 8 years ago and that [facility] has changed in many amazing ways and that \"things have improved greatly\". SW ensured Patsy Gallegos has SW information and informed him that SW will continue to reach out weekly and assess for needs and provide emotional support.

## 2021-06-16 NOTE — PROGRESS NOTES
Problem: Anticipatory Grief Goal: Grief heard and acknowledged, anxiety reduced, patient coping identified, patient/family expressed gratitude Outcome: Progressing Towards Goal 
Note: Patient is unable to respond. Family will acknowledge feelings of grief and anxiety and utilize available resources to assist in their grief journey.

## 2021-06-16 NOTE — PROGRESS NOTES
Problem: Falls - Risk of 
Goal: *Absence of Falls Description: Document Jt Do Fall Risk and appropriate interventions in the flowsheet. Outcome: Progressing Towards Goal 
Note: Fall Risk Interventions: 
  
 
Mentation Interventions: Adequate sleep, hydration, pain control, Bed/chair exit alarm, Door open when patient unattended, More frequent rounding Medication Interventions: Bed/chair exit alarm Elimination Interventions: Bed/chair exit alarm, Call light in reach History of Falls Interventions: Bed/chair exit alarm, Door open when patient unattended Problem: Patient Education: Go to Patient Education Activity Goal: Patient/Family Education Outcome: Resolved/Met Problem: Pressure Injury - Risk of 
Goal: *Prevention of pressure injury Description: Document Edd Scale and appropriate interventions in the flowsheet. Outcome: Progressing Towards Goal 
Note: Pressure Injury Interventions: 
Sensory Interventions: Assess changes in LOC, Assess need for specialty bed, Float heels, Keep linens dry and wrinkle-free, Minimize linen layers Moisture Interventions: Absorbent underpads, Apply protective barrier, creams and emollients, Maintain skin hydration (lotion/cream), Minimize layers, Moisture barrier Activity Interventions: Assess need for specialty bed Mobility Interventions: Assess need for specialty bed, Float heels Nutrition Interventions:  (npo) Friction and Shear Interventions: Apply protective barrier, creams and emollients, HOB 30 degrees or less, Lift sheet, Minimize layers Problem: Patient Education: Go to Patient Education Activity Goal: Patient/Family Education Outcome: Resolved/Met Problem: Pain Goal: *Control of Pain Description: Katherina Bosworth will have pain control AEB a FLACC score of 2 or less. Outcome: Progressing Towards Goal 
  
Problem: Anxiety/Agitation Goal: Verbalize and demonstrate ability to manage anxiety Description:  The patient/family/caregiver will verbalize and demonstrate ability to manage the patient's anxiety throughout hospice care. Outcome: Progressing Towards Goal 
  
Problem: Breathing Pattern - Ineffective Goal: *Absence of hypoxia Outcome: Progressing Towards Goal 
  
Problem: Infection - Risk of, Urinary Catheter-Associated Urinary Tract Infection Goal: *Absence of infection signs and symptoms Outcome: Progressing Towards Goal 
  
Problem: Aspiration - Risk of 
Goal: *Absence of aspiration Outcome: Progressing Towards Goal 
  
Problem: Patient Education: Go to Patient Education Activity Goal: Patient/Family Education Outcome: Progressing Towards Goal

## 2021-06-16 NOTE — PROGRESS NOTES
2350-Report received from Rajni Luque RN. Patient identified by name and . Patient resting quietly in bed with eyes closed. No signs or symptoms of distress, pain, agitation/anxiety, or SOB noted at present. Oxygen in use at 2L  NC. Morgan catheter in place draining benito colored urine. Bed locked and in lowest position, side rails up x 2, call bell within reach, tab alarm in place. No family present at bedside. Door open for continuous monitoring. 0115-Patient resting in bed with eyes partially closed. Respirations unlabored with no signs or symptoms of distress, pain, agitation, or discomfort noted. Patient admitted to [facility] on 2021 with a terminal diagnosis of Sequelae of stroke. Patient is GIP level of care for symptom management of pain, dyspnea, agitation, and dysphagia. Patient is obtunded but does responds to pain and stimuli. Patient has no voluntary movement. Patient appetite remain npo. Patient requiring skilled nursing assessment, ongoing monitoring, and reevaluation of medication regimen to achieve optimum level of comfort. Patient will remain in [facility] under [facility] LOC until demise. Will continue to evaluate patient D/C plan for potential change of LOC. FLACC 0. 
 
0359-PRN Morphine 2mg IV given for symptom management of dyspnea aeb patient having labored breathing. Patient remains with eyes partially opened and unable to verbalize any needs. Patient continues to require skilled nursing assessment, ongoing monitoring, and reevaluation of medication regimen to achieve optimum level of comfort. FLACC 2/10. 
 
0430-Patient resting quietly in bed with eyes closed. Respirations unlabored after previous dose of PRN medication. FLACC 0. 
 
0603-Patient resting quietly in bed with eyes closed. Respirations unlabored with no signs or symptoms of distress, pain, agitation, or discomfort noted. FLACC 0.  
 
Report given to Abelino Acosta RN

## 2021-06-16 NOTE — PROGRESS NOTES
-Report received from Tracy Angeles RN. Patient identified by name and . Patient resting quietly in bed with eyes closed. No signs or symptoms of distress, pain, agitation/anxiety, or SOB noted at present. Oxygen in use at 2L  Morgan catheter in place draining benito colored urine. Bed locked and in lowest position, side rails up x 2, call bell within reach, tab alarm in place. No family present at bedside. Door open for continuous monitoring. -Patient admitted to [facility] on 2021 with a terminal diagnosis of Sequelae of stroke. Patient is GIP level of care for symptom management of pain, dyspnea, agitation, and dysphagia. Patient is obtunded but does responds to pain and stimuli. Patient has no voluntary movement. Patient appetite remain npo. Patient requiring skilled nursing assessment, ongoing monitoring, and reevaluation of medication regimen to achieve optimum level of comfort. Patient will remain in [facility] under [facility] LOC until demise. Will continue to evaluate patient D/C plan for potential change of LOC. FLACC 0. 
 
-Nurse entered into patient's room for rounding. Patient has eyes partially opened but not alert or focused. Respirations labored at this time with respirations being 36. PRN Morphine 2mg IV given at this time. Patient continues to require skilled nursing assessment, ongoing monitoring, and reevaluation of medication regimen to achieve optimum level of comfort. FLACC 2/10. 
 
-Patient resting quietly in bed with eyes remaining opened. Respirations 24 at this time after PRN dose of medication. Patient appears not distress. FLACC 0. 
 
2230-Patient resting quietly in bed with eyes closed. Respirations unlabored with no signs or symptoms of distress, pain, agitation, or discomfort noted. FLACC 0. 
 
0030-Patient turned and repositioned at this time. Patient tolerated well. Patient continues to rest in bed with eyes opened.  No signs or symptoms of distress, pain, agitation, or discomfort noted. FLACC 0. 
 
0205-Patient resting quietly in bed with eyes closed. Respirations unlabored with no signs or symptoms of distress, pain, agitation, or discomfort noted. FLACC 0. 
 
0407-Patient resting quietly in bed with eyes closed. Respirations unlabored with no signs or symptoms of distress, pain, agitation, or discomfort noted. FLACC 0. 
 
0452-PRN Morphine 2mg IV given for symptom management of dyspnea aeb patient having labored breathing with respirations at 34. Respirations labored. Patient continues to have eyes opened. FLACC 2/10. 
 
0543-PRN Morphine 2mg IV given for symptom management of dyspnea. Respirations remain labored at 34. 
 
0610-Patient resting after PRN dose of Morphine. Respirations unlabored with respirations at 26. No distress seen. FLACC 0.  
 
Report given to Lyubov Mahoney RN

## 2021-06-16 NOTE — PROGRESS NOTES
Problem: Emotional Support Needs Goal: Patient/family is receiving emotional support Description: Pt, Talya Handyfrida, and family will receive emotional support weekly through the use of validation of feelings, weekly check-ins from SW, and education on the hospice journey as needed.   
Outcome: Progressing Towards Goal

## 2021-06-16 NOTE — PROGRESS NOTES
[facility]50[facility] Report taken from [facility]. Pt identified. Pt in bed with eyes closed; displaying no signs or symptoms of pain, dyspnea, agitation,seizures, nausea, or vomiting. FLACC 0. Bed locked and low, side rails up, tabs/bed alarm in place for pt. safety. Call light with in reach, and door opened for continued monitoring. Care plan reviewed with Cna.  
 
0829 Pt observed and appears to be sleeping/resting, no facial grimacing, no moaning, easy relaxed respirations. No symptoms to manage at this time. Flacc 0/10 
 
0905 Iv's  flushed. Pt did not respond when tactile or verbal stimuli applied. Flacc 0/10. 
 
1025 Patient admitted to [facility] on 6/14/2021 with a terminal diagnosis of Sequelae of stroke. Patient is [facility] level of care for symptom management of pain, dyspnea, agitation, and dysphagia. Patient is obtunded but does responds to pain and stimuli. Patient has no voluntary movement. Patient requiring skilled nursing assessment, ongoing monitoring, and reevaluation of medication regimen to achieve optimum level of comfort. Patient will remain in [facility] under [facility] LOC until demise. Will continue to evaluate patient D/C plan for potential change of LOC. 1115 Pt observed and appears to be sleeping/resting, no facial grimacing, no moaning, easy relaxed respirations. No symptoms to manage at this time. Flacc 0/10 
 
1306 Pt observed and appears to be sleeping/resting, no facial grimacing, no moaning, easy relaxed respirations. No symptoms to manage at this time. Flacc 0/10 
1505 Pt observed and appears to be sleeping/resting, no facial grimacing, no moaning, easy relaxed respirations. No symptoms to manage at this time. Flacc 0/10 
1636 Cna notified me that pt had a fever and frequent respirations. Will medicate. 1700 Pt given suppository as he is running a fever, haldol and morphine as he is working very hard to breathe. Pt using accessory muscles and abdominal trusting.  
[address] [place] [address] down to about 28 breaths per min. Pt isnt using as many accessory muscles. 1825 Report to be given to Maribeth Godoy RN Pt observed and appears to be sleeping/resting, no facial grimacing, no moaning, easy relaxed respirations. No symptoms to manage at this time. Flacc 0/10

## 2021-06-16 NOTE — PROGRESS NOTES
Background: Mr. Huston Mount Vernonvandana Wilkins is an 81year old gentleman who according to the chart has a diagnosis of sequelae, post stroke. He has never been  and has no children. He is supported by his nephew ( his mother's brother). He has lived in [place] all his life. He attended [employer]. While in the [employer] he attended medic training. 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  His [address] is [employer]. He attended [employer] for many years, until the original [employer] closed. Lindy some time now Mr. Goldy Wilkins has needed assistance with his ADL's. He is Blind. His nephew Karol Cushing helped him with grocery and Medicine . Karol Cushing says he called his uncle a couple of times a day. When he called and could not get an answer he went over to check on him and found him unresponsive. Karol Cushing is no stranger to hospice. His wife  at [facility] 8 years ago. Assessment:  began by calling Arun via telephone. He was very receptive to 's call. He shared bits of his journey with his uncle. After Arun's mom , he became the person who stayed connected with his uncle. He is very fond of Mr. Goldy Wilkins. He says they have talked about end of life things. Mr. Goldy Wilkins told him that he was not afraid to die and he is ready to go be with God whenever that time comes. Karol Cushing is comforted by that conversation. Karol Cushing talked with his uncle twice a day right up until his stroke. He continues to be supportive. He calls to check on him and plans to be here tomorrow to visit.  assured him Mr. Goldy Wilkins is getting excellent care and is in good hands.  assured him that she would visit and provide spiritual support throughout his uncle's stay.   Following the conversation,  spent time with Mr. Goldy Wilkins offering spiritual care in the form of words of assurance and comfort, reading of Psa 121 
 
1 I lift up my eyes to the mountains 
    where does my help come from? 2 My help comes from the [place], 
    the [employer] of heaven and earth. 3 He will not let your foot slip 
    he who watches over you will not slumber; 
4 indeed, he who watches over Kylah 
    will neither slumber nor sleep. 5 The Lord watches over you 
    the [place] is your shade at your right hand; 
6 the sun will not harm you by day, 
    nor the moon by night. 7 The [place] will keep you from all harm 
    he will watch over your life; 
8 the [place] will watch over your coming and going 
    both now and forevermore.  offered prayer and sang Softly and Abi Huggins is Calling, Sheltered In the Arms of God and A chorus of [employer]. Plan: Continue to provide support at bedside with spiritual rituals and ministry of presence. Be available to support family. Focus on Anticipatory Grief.

## 2021-06-16 NOTE — PROGRESS NOTES
Problem: Risk for Spread of Infection Goal: Prevent transmission of infectious organism to others Description: Prevent the transmission of infectious organisms to other patients, staff members, and visitors. Outcome: Resolved/Met

## 2021-06-17 NOTE — PROGRESS NOTES
7379 Report received from Sima Mathias RN. Pt was admitted on 6-14 with hospice dx of sequelae of stroke and symptom management of pain, dyspnea, agitation and dysphagia. Pt required three doses of PRN Morphine over night shift. Currently pt is resting, eyes remain open but pt does not focus. FLACC 0/10. RR even and unlabored. Oxygen in place via NC at 2L. Morgan to gravity in place and draining with clear yellow urine. Pt has mottled left toes and are cold to the touch. Mouth care performed. D/C planning is for patient to remain at [facility] until his passing however, LOC will be evaluated on ongoing basis. Safety measures in place with bed low and locked, call bell within reach, tab alert in place, side rails up x2. Door remains open for continued monitoring. 0820 Shift assessment completed. Pt continues to rest with FLACC 
0/10. PIV site flushed per order. Safety measures in place. 1020 Pt medicated with Morphine 2mg IVP for increased RR of 28. Safety measures in place. Door remains open for continued monitoring. 1102 Re-eloisa to 1029 PRN Morphine dose, pt resting with RR 24, even and unlabored. Oxygen in place via NC. Safety measures in place. Door remains open for continued monitoring. 1155 Pt resting with his eyes open, no signs or symptoms of pain or distress noted. Safety measures in place. Door remains open for continued monitoring. 1450 Pt resting without changes in condition. RR even and unlabored at 28. Safety measures in place. Door remains open for continued monitoring. 1526 Scheduled dose of Morphine 2mg IVP given per orders. RR 30, even and unlabored. Oxygen in place via NC. Safety measures in place. Door remains open for continued monitoring. 1708 Tylenol suppository given for Temp 101.8. Blankets removed, fan on. 
 
1830 Pt resting with shallow breathing and RR 28. Safety measures in place. Door remains open for continued monitoring. Report given to oncoming RN.

## 2021-06-17 NOTE — PROGRESS NOTES
Progress Note Patient: Otis Fernandez MRN: [id number]  SSN: [id number] YOB: 1935  Age: 81 y.o. Sex: male Admit Date: 6/14/2021 LOS: 3 days Subjective:  
 
Unresponsive except to noxious stimuli. NPO. Eyes are open and fixed. Review of Systems: 
Review of systems not obtained due to patient factors. Objective:  
 
Vitals:  
 06/16/21 0630 06/16/21 1657 06/17/21 0512 06/17/21 1640 BP: (!) 163/82 (!) 147/69 (!) 152/73 110/67 Pulse: (!) 114 74 74 81 Resp: 22 (!) 34 (!) 38 (!) 31 Temp: 98.8 °F (37.1 °C) (!) 103.1 °F (39.5 °C) 99.1 °F (37.3 °C) (!) 101.8 °F (38.8 °C) Intake and Output: 
Current Shift: No intake/output data recorded. Last three shifts: 06/16 0701 - 06/17 1900 In: -  
Out: 525 [Urine:525] Physical Exam:  
GENERAL: moderate distress, appears older than stated age, pale, unresponsive. EYES: Left eye with advanced cataract. Eyes are open and fixed LUNG: Coarse breath sounds. Labored respirations. Tachypneic. Audible secretions. HEART: irregularly irregular rhythm ABDOMEN: soft, non-tender. Bowel sounds hypoactive. : Morgan catheter with benito urine. EXTREMITIES:  extremities with no cyanosis or edema. SKIN: Pale. Hot to touch. Skin intact. NEUROLOGIC: Unresponsive. Generalized weakness. Bedbound. Positive right babinski reflex. Left great toe hyperextended with negative babinski reflex. Lab/Data Review: No new labs resulted in the last 24 hours. Assessment:  
 
Principal Problem: 
  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 
  CVA (cerebral vascular accident) ([facility] [address] (6/13/2021) Hospice care patient (6/14/2021) Plan:  
 
Current Facility-Administered Medications Medication Dose Route Frequency  morphine injection 2 mg  2 mg IntraVENous Q6H  
 morphine injection 2 mg  2 mg SubCUTAneous Q20MIN PRN  Or  
 morphine injection 2 mg  2 mg IntraVENous Q20MIN PRN  
 haloperidol lactate (HALDOL) injection 2 mg  2 mg SubCUTAneous Q1H PRN Or  
 haloperidol lactate (HALDOL) injection 2 mg  2 mg IntraVENous Q1H PRN  
 haloperidol lactate (HALDOL) injection 2 mg  2 mg SubCUTAneous Q1H PRN Or  
 haloperidol lactate (HALDOL) injection 2 mg  2 mg IntraVENous Q1H PRN  
 sodium chloride (NS) flush 3 mL  3 mL IntraVENous Q12H  
 sodium chloride (NS) flush 3 mL  3 mL IntraVENous PRN  
 acetaminophen (TYLENOL) suppository 650 mg  650 mg Rectal Q3H PRN  
 bisacodyL (DULCOLAX) suppository 10 mg  10 mg Rectal PRN  
 glycopyrrolate (ROBINUL) injection 0.2 mg  0.2 mg IntraVENous Q4H PRN  
 LORazepam (ATIVAN) injection 2 mg  2 mg IntraVENous Q10MIN PRN  
 
 
  
6/14: (SFD) Admitted GIP with sequelae of stroke for management of pain, dyspnea, dysphagia and agitation.  
  
1. Pain: Morphine 2mg IV/SQ Q20 minutes as needed. 
  
2. Dyspnea: Morphine 2mg IV/SQ Q20 minutes as needed. Glycopyrrolate 0.2mg q4 prn secretions. Oxygen at 2 L/min prn. 
  
3. Dysphagia: Diet as tolerated. Keep HOB elevated with po intake. Oral suction as needed. 
  
4. Agitation: Haloperidol 2mg IV/SQ Q1 hour prn. 
  
5. Family/Pt Support: No family at bedside during exam. Medications and plan of care discussed with nursing staff. Will continue to monitor for symptoms and adjust medications as needed to maintain patient comfort. PPS 10 %. Case discussed with Dr. Nicola Treviño. Add Morphine 2mg IV Q6 for pain/dyspnea. Add refresh eye drops for ocular dryness. Signed By: Zane Pineda NP   
 June 17, 2021

## 2021-06-17 NOTE — PROGRESS NOTES
Problem: Falls - Risk of 
Goal: *Absence of Falls Description: Document Crow Bryant Fall Risk and appropriate interventions in the flowsheet. Outcome: Progressing Towards Goal 
Note: Fall Risk Interventions: 
  
 
Mentation Interventions: Adequate sleep, hydration, pain control, Bed/chair exit alarm, Door open when patient unattended, More frequent rounding Medication Interventions: Bed/chair exit alarm Elimination Interventions: Bed/chair exit alarm, Call light in reach History of Falls Interventions: Bed/chair exit alarm, Door open when patient unattended Problem: Pressure Injury - Risk of 
Goal: *Prevention of pressure injury Description: Document Edd Scale and appropriate interventions in the flowsheet. Outcome: Progressing Towards Goal 
Note: Pressure Injury Interventions: 
Sensory Interventions: Assess changes in LOC, Assess need for specialty bed, Float heels, Keep linens dry and wrinkle-free, Minimize linen layers Moisture Interventions: Absorbent underpads, Apply protective barrier, creams and emollients, Maintain skin hydration (lotion/cream), Minimize layers, Moisture barrier Activity Interventions: Assess need for specialty bed Mobility Interventions: Assess need for specialty bed, Float heels Nutrition Interventions:  (npo) Friction and Shear Interventions: Apply protective barrier, creams and emollients, HOB 30 degrees or less, Lift sheet, Minimize layers Problem: Pain Goal: *Control of Pain Description: Linda Sullivan will have pain control AEB a FLACC score of 2 or less. Outcome: Progressing Towards Goal 
  
Problem: Breathing Pattern - Ineffective Goal: *Absence of hypoxia Outcome: Progressing Towards Goal 
  
Problem: Nausea/Vomiting (Adult) Goal: *Absence of nausea/vomiting Outcome: Progressing Towards Goal 
  
Problem: Infection - Risk of, Urinary Catheter-Associated Urinary Tract Infection Goal: *Absence of infection signs and symptoms Outcome: Progressing Towards Goal 
  
Problem: Aspiration - Risk of 
Goal: *Absence of aspiration Outcome: Progressing Towards Goal 
  
Problem: Emotional Support Needs Goal: Patient/family is receiving emotional support Description: Pt, Earvin Landau, and family will receive emotional support weekly through the use of validation of feelings, weekly check-ins from SW, and education on the hospice journey as needed.   
Outcome: Progressing Towards Goal

## 2021-06-18 NOTE — PROGRESS NOTES
1836Rosaura Boy received from [facility]. Ana Hopper Pt name and  identified. Pt in bed, resting with eyes open. No signs or sx's of anxiety , seizures or nausea/vomiting. RR 32 . Comfort and safety measures in place. HOB elevated 30 degrees. Side rails up x4. Bed low and locked. Bed alarm tab in place. Call light in reach of patient, Door open for monitored visualization and hearing of patient. Care Plan reviewed and collaboration done with CNA. Pt expected to pass under GIP however will continue to assess change in LOC, medication & comfort needs, while collaborating with the Interdisciplinary Team. 
 
8132: REASSESS Pt more relaxed with breathing after dosed with PRN Morphine,  Proceeded to assist CNA with bath. 4334:  Scheduled Meds given per MAR. Pt remains restful, respirations even and nonlabored. No facial grimacing noted. FLACC 0/10. No additional sx's to manage at this time. 1108: Pt observed, sleeping without acute change. Respirations even and nonlabored. No facial grimacing noted. No moaning or agitation. Flacc 0/10. Comfort and Safety measures maintained. 1246: Scheduled Meds given per MAR. Pt remains restful, respirations 36. Ana Hopper No facial grimacing noted. FLACC 0/10. No additional sx's to manage at this time. 1416:  Pt observed, sleeping without acute change. Respirations even and nonlabored. No facial grimacing noted. No moaning or agitation. Flacc 0/10. Comfort and Safety measures maintained. 1645:  Pt observed, sleeping without acute change. Respirations even and nonlabored since new Morphine order/dose. No facial grimacing noted. No moaning or agitation. Flacc 0/10. Comfort and Safety measures maintained. CNA taking VS, Pt's temp is 101.0 axillary. Comfort and safety measures maintained. 1756:  Scheduled Meds given per MAR. Pt remains restful, respirations even and nonlabored. RR remains 30. No facial grimacing noted. FLACC 0/10.  New order for eye gtts received; rewetting gtts placed to both eyes, no corneal reaction to gtts or tissue noted. Eyelids remains open and fixed. No additional sx's to manage at this time. Comfort and safety measures maintenanced.   
 
1900: Shift Report given to [facility]

## 2021-06-18 NOTE — HSPC IDG CHAPLAIN NOTES
Patient: Doug Alvarado Date: 06/18/21 Time: 11:46 AM 
 
[facility]  Notes Intervention: Ministry of presence, scripture, prayer, family support via telephone. Outcome: Patient appeared to be obtunded. His nephew [facility] shared his connection with the patient via telephone. He shared his uncle's Tawanna Tradition. Plan:Continue to provide spiritual and emotional support. See care plan. Signed by: Goldy Burow

## 2021-06-18 NOTE — PROGRESS NOTES
Problem: Falls - Risk of 
Goal: *Absence of Falls Description: Document Dylan Vaz Fall Risk and appropriate interventions in the flowsheet. Outcome: Progressing Towards Goal 
Note: Fall Risk Interventions: 
  
 
Mentation Interventions: Adequate sleep, hydration, pain control, Bed/chair exit alarm Medication Interventions: Bed/chair exit alarm Elimination Interventions: Bed/chair exit alarm, Call light in reach History of Falls Interventions: Bed/chair exit alarm Problem: Pressure Injury - Risk of 
Goal: *Prevention of pressure injury Description: Document Edd Scale and appropriate interventions in the flowsheet. Outcome: Progressing Towards Goal 
Note: Pressure Injury Interventions: 
Sensory Interventions: Assess changes in LOC, Assess need for specialty bed, Minimize linen layers, Pad between skin to skin Moisture Interventions: Absorbent underpads, Internal/External urinary devices, Minimize layers Activity Interventions: Assess need for specialty bed, Pressure redistribution bed/mattress(bed type) Mobility Interventions: Assess need for specialty bed, Pressure redistribution bed/mattress (bed type) Nutrition Interventions: Document food/fluid/supplement intake, Discuss nutritional consult with provider, Offer support with meals,snacks and hydration (No longer taking in PO) Friction and Shear Interventions: Apply protective barrier, creams and emollients, Minimize layers, Lift sheet Problem: Pain Goal: *Control of Pain Description: Joanie Ponce will have pain control AEB a FLACC score of 2 or less.   
Outcome: Progressing Towards Goal

## 2021-06-18 NOTE — HSPC IDG VOLUNTEER NOTES
Patient: Alcon Draft Date: 06/18/21 Time: 11:46 AM 
 
[facility] Volunteer Notes Volunteer  Hospice Interdisciplinary Plan of Care Review Status Codes  C=Continued R=Revised RS = Resolved NV= No visits per Infection Control Policy or family request 
  
C. Volunteer Goal: Hospice house volunteer (s) enhances the quality of remaining life while patient is at the hospice house. Pt will continue to receive general support by volunteers as evidenced by comfort bag delivery, snack cart, supplies to the room, companionship visits, pet therapy and/or therapeutic music. Any other special requests or information for volunteer: not at this time. Signed by: Kim Avina

## 2021-06-18 NOTE — ADVANCED PRACTICE NURSE
IDG NOTE Subjective:  
Unresponsive. Feet are mottled. Intake: 
NPO Scheduled medications: 
Current Facility-Administered Medications Medication Dose Route Frequency  morphine injection 4 mg  4 mg IntraVENous Q6H Or  
 morphine injection 4 mg  4 mg SubCUTAneous Q6H  
 carboxymethylcellulose sodium (REFRESH LIQUIGEL) 1 % ophthalmic solution 1 Drop  1 Drop Both Eyes Q6H  
 sodium chloride (NS) flush 3 mL  3 mL IntraVENous Q12H  
 
 
PRN medications/symptoms: 
Medications Administered   
 acetaminophen (TYLENOL) suppository 650 mg x 1 for fever 101.8 axillary  
  
  
 morphine injection 2 mg x 2 for dyspnea Wounds: N/A Output: Morgan catheter 125  ml urine output IV access: Peripheral IV x 2 Oxygen:  Oxygen at 2 L/min via NC as needed for shortness of breath Patient Vitals for the past 24 hrs: 
 Temp Pulse Resp BP  
06/18/21 0413 98.7 °F (37.1 °C) 69 (!) 36 (!) 131/90  
06/18/21 0139   (!) 38   
06/17/21 1640 (!) 101.8 °F (38.8 °C) 81 (!) 31 110/67 Changes in plan of care: Increase scheduled morphine to 4mg Q6 hours and increase prn morphine to 4mg. Add refresh eye drops scheduled q6 and prn for ocular dryness due to eyes not closing. New patient: Comprehensive plan of care reviewed. IDG and pt./family in agreement with plan of care. The IDG identifies through on-going assessment when a change is needed to the POC; the pt/family will receive care and services necessitated by changes in POC. Medications reviewed by the pharmacist and Medical Director. CARIE Lopez 
06/18/21

## 2021-06-18 NOTE — PROGRESS NOTES
Progress Note Patient: Jean Garcia MRN: [id number]  SSN: [id number] YOB: 1935  Age: 81 y.o. Sex: male Admit Date: 6/14/2021 LOS: 4 days Clinical Summary:  
 
Sheldon Argueta remains unresponsive with significant tachypnea requiring several parenteral doses of MORPHINE in the past 24 hours. He shows no other signs of respiratory distress or other new neurologic abnormalities. Vitals:  
 06/17/21 0512 06/17/21 1640 06/18/21 0139 06/18/21 0413 BP: (!) 152/73 110/67  (!) 131/90 Pulse: 74 81  69 Resp: (!) 38 (!) 31 (!) 38 (!) 36 Temp: 99.1 °F (37.3 °C) (!) 101.8 °F (38.8 °C)  98.7 °F (37.1 °C) Clinical Assessment:  
 
Principal Problem: 
  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 
  CVA (cerebral vascular accident) ([facility] [address] (6/13/2021) Hospice care patient (6/14/2021) Treatment Plan:  
 
 I have reviewed the patient's Plan of Care with the nursing staff. He is stable and comfortable despite his tachypnea. We will continue his current regimen. Death is anticipated within the week. Current Facility-Administered Medications Medication Dose Route Frequency  morphine injection 4 mg  4 mg IntraVENous Q20MIN PRN Or  
 morphine injection 4 mg  4 mg SubCUTAneous Q20MIN PRN  
 morphine injection 4 mg  4 mg IntraVENous Q6H Or  
 morphine injection 4 mg  4 mg SubCUTAneous Q6H  
 carboxymethylcellulose sodium (REFRESH LIQUIGEL) 1 % ophthalmic solution 1 Drop  1 Drop Both Eyes Q6H  
 carboxymethylcellulose sodium (REFRESH LIQUIGEL) 1 % ophthalmic solution 1 Drop  1 Drop Both Eyes PRN  
 haloperidol lactate (HALDOL) injection 2 mg  2 mg SubCUTAneous Q1H PRN Or  
 haloperidol lactate (HALDOL) injection 2 mg  2 mg IntraVENous Q1H PRN  
 haloperidol lactate (HALDOL) injection 2 mg  2 mg SubCUTAneous Q1H PRN  Or  
 haloperidol lactate (HALDOL) injection 2 mg  2 mg IntraVENous Q1H PRN  
 sodium chloride (NS) flush 3 mL  3 mL IntraVENous Q12H  
 sodium chloride (NS) flush 3 mL  3 mL IntraVENous PRN  
 acetaminophen (TYLENOL) suppository 650 mg  650 mg Rectal Q3H PRN  
 bisacodyL (DULCOLAX) suppository 10 mg  10 mg Rectal PRN  
 glycopyrrolate (ROBINUL) injection 0.2 mg  0.2 mg IntraVENous Q4H PRN  
 LORazepam (ATIVAN) injection 2 mg  2 mg IntraVENous Q10MIN PRN Signed By: Ciro Denney MD   
 June 18, 2021

## 2021-06-18 NOTE — HSPC IDG SOCIAL WORKER NOTES
Patient: Alejandra Marroquin Date: 06/18/21 Time: 11:46 AM 
 
[facility]  Notes Pt remains under [facility] LOC. No changes in the plan of care. SW will continue to provide ongoing emotional support and assessment of psychosocial and bereavement concerns, as well as needs until discharge. Signed by: Steven Philippe LMSW

## 2021-06-18 NOTE — PROGRESS NOTES
Bedside report received from off-going RN visual identification made, assumed care of pt. Pt resting quietly with eyes closed, no agitation or restlessness, no grimacing or groaning. Pt respirations unlabored but shallow RR 26. Tab alert in place, rails up x 2, bed in lowest position, safety maintained. FLACC 0. Pt is at GIP level of care, no change to pt discharge plan. Pt to stay at [facility] until passing. Plan of care reviewed with CNA. 2123-Pt resting comfortably in bed with eyes closed; no signs of pain or distress noted. RR sh non labored. No agitation, NVD, or SOB. FLACC 0/10. 
 
2316- Pt resting comfortably in bed with eyes closed; no signs of pain or distress noted. RR non labored. No agitation, NVD, or SOB. FLACC 0/10. 
 
0013- Pt resting comfortably in bed with eyes open, no s/sx of pain or agitation noted. Pt RR 10 shallow and unlabored. 0235- Pt resting comfortably in bed breathing shallow but unlabored, no pain or distress noted. 0512-Pt resting comfortably in bed with eyes closed; no signs of pain or distress noted. RR non labored. No agitation, NVD, or SOB. FLACC 0/10.

## 2021-06-18 NOTE — PROGRESS NOTES
184:  Patient report from Karolina Hines RN. Patient ID by name and . Patient here for [facility] LOC for hospice diagnosis of sequelae of stroke to manage symptoms of pain and agitation. Patient currently in bed with eyes open. No s/sx of pain, agitation, or dyspnea observed. FLACC 0/10. RN has reviewed POC with CNA. Patient to remain in [facility] in [address] [place] [address] LOC until demise. Discharge plan to be evaluated for patient potential LOC change. Bed low and locked and all safety measures in place. Door open. :  Physical assessment complete and documented in flowsheets. Patient noted to be labored in his breathing on assessment. PRN Morphine 4 mg IV given for dyspnea and peripheral IV flushed as ordered. Will reassess. :  Reassessment:  Patient with less labored breathing noted. :  Patient with no heartbeat or respirations present. Deo Calix, notified via phone. Patient to go to [facility].

## 2021-06-18 NOTE — [facility] IDG MASTER NOTE
[address] Date: 06/18/21 Time: 3:15 PM 
 
___________________ Patient: Charity Jaime Coverage Information: 
   Payor: [place] Plan: [place] MED[place] PART A AND B Subscriber ID: [id number] Phone Number: MRN: [id number] Current Benefit Period: Benefit Period 1 Start Date: 6/14/2021 End Date: 9/11/2021 Hospice Attending Provider: Mikaela Luque MD 
[address] [place] [address]  [id number] Phone: [phone] Fax: [phone] Level of Care: General Inpatient Care 
 
IDG NOTE 
  
Subjective:  
Unresponsive. Feet are mottled.  
  
Intake: NPO 
  
Scheduled medications: 
      
Current Facility-Administered Medications Medication Dose Route Frequency  morphine injection 4 mg  4 mg IntraVENous Q6H  
  Or  morphine injection 4 mg  4 mg SubCUTAneous Q6H  
 carboxymethylcellulose sodium (REFRESH LIQUIGEL) 1 % ophthalmic solution 1 Drop  1 Drop Both Eyes Q6H  
 sodium chloride (NS) flush 3 mL  3 mL IntraVENous Q12H  
  
  
PRN medications/symptoms: 
   
Medications Administered   
  acetaminophen (TYLENOL) suppository 650 mg x 1 for fever 101.8 axillary  
   
  
  morphine injection 2 mg x 2 for dyspnea  
  
  
  
Wounds: N/A   
  
Output: Morgan catheter 125  ml urine output 
  
IV access: Peripheral IV x 2 
  
Oxygen:  Oxygen at 2 L/min via NC as needed for shortness of breath 
  
Patient Vitals for the past 24 hrs: 
  Temp Pulse Resp BP  
06/18/21 0413 98.7 °F (37.1 °C) 69 (!) 36 (!) 131/90  
06/18/21 0139   (!) 38   
06/17/21 1640 (!) 101.8 °F (38.8 °C) 81 (!) 31 110/67  
  
  
Changes in plan of care: 
  
Increase scheduled morphine to 4mg Q6 hours and increase prn morphine to 4mg. Add refresh eye drops scheduled q6 and prn for ocular dryness due to eyes not closing.  
  
New patient: Comprehensive plan of care reviewed. IDG and pt./family in agreement with plan of care.  The IDG identifies through on-going assessment when a change is needed to the [address]; the pt/family will receive care and services necessitated by changes in POC. Medications reviewed by the pharmacist and Medical Director. 
[address] CARIE BATISTA 
06/18/21 
 
___________________ Diagnoses: There were no encounter diagnoses. Current Medications: 
 
Current Facility-Administered Medications:  
  morphine injection 4 mg, 4 mg, IntraVENous, Q20MIN PRN **OR** morphine injection 4 mg, 4 mg, SubCUTAneous, Q20MIN PRN, Montine Caulk, NP 
  morphine injection 4 mg, 4 mg, IntraVENous, Q6H, 4 mg at 06/18/21 1243 **OR** morphine injection 4 mg, 4 mg, SubCUTAneous, Q6H, Reyna Bullock, NP 
  carboxymethylcellulose sodium (REFRESH LIQUIGEL) 1 % ophthalmic solution 1 Drop, 1 Drop, Both Eyes, Q6H, Reyna Bullock NP 
  carboxymethylcellulose sodium (REFRESH LIQUIGEL) 1 % ophthalmic solution 1 Drop, 1 Drop, Both Eyes, PRN, Montine Caulk, NP 
  haloperidol lactate (HALDOL) injection 2 mg, 2 mg, SubCUTAneous, Q1H PRN **OR** haloperidol lactate (HALDOL) injection 2 mg, 2 mg, IntraVENous, Q1H PRN, Montine Caulk, NP, 2 mg at 06/15/21 1021 
  haloperidol lactate (HALDOL) injection 2 mg, 2 mg, SubCUTAneous, Q1H PRN **OR** haloperidol lactate (HALDOL) injection 2 mg, 2 mg, IntraVENous, Q1H PRN, Montine Caulk, NP, 2 mg at 06/16/21 1710 
  sodium chloride (NS) flush 3 mL, 3 mL, IntraVENous, Q12H, Montine Caulk, NP, 3 mL at 06/18/21 4787   sodium chloride (NS) flush 3 mL, 3 mL, IntraVENous, PRN, Montine Caulk, NP, 3 mL at 06/18/21 1244   acetaminophen (TYLENOL) suppository 650 mg, 650 mg, Rectal, Q3H PRN, Montine Caulk, NP, 650 mg at 06/17/21 1708   bisacodyL (DULCOLAX) suppository 10 mg, 10 mg, Rectal, PRN, Montine Caulk, NP 
  glycopyrrolate (ROBINUL) injection 0.2 mg, 0.2 mg, IntraVENous, Q4H PRN, Montine Caulk, NP 
  LORazepam (ATIVAN) injection 2 mg, 2 mg, IntraVENous, Q10MIN PRN, Montine Caulk, NP Orders: 
Orders Placed This Encounter  IP CONSULT TO SPIRITUAL CARE Once on week one, then PRN. For [facility] patients only. For contracted patients, primary hospice will continue to manage spiritual care needs Once on week one, then PRN. For [facility] patients only. For contracted patients, primary hospice will continue to manage spiritual care needs Standing Status:   Standing Number of Occurrences:   1 Order Specific Question:   Reason for Consult: Answer:   Spiritual crisis intervention or per patient or caregiver request  
 DIET PLEASURE Standing Status:   Standing Number of Occurrences:   1 Order Specific Question:   Likes/Dislikes/Preferences Answer:   hold [address] Standing Status:   Standing Number of Occurrences:   1 [address] Standing Status:   Standing Number of Occurrences:   1  
 NURSING-MISCELLANEOUS: comfort measures Enter comfort measures above. CONTINUOUS Enter comfort measures above. Standing Status:   Standing Number of Occurrences:   1 Order Specific Question:   Description of Order: Answer:   comfort measures  CHAO CATHETER, CARE 1. Chao Catheter care every shift and PRN 2. Notify Physician of Chao Catheter leakage, occlusion, gross adherent sediment or accidental removal 
3. Change Chao 30 days after insertion. 4. May flush catheter prn leakage or gross adherent sediment or mucus. Standing Status:   Standing Number of Occurrences:   1  
 BLADDER CHECKS May scan bladder PRN for urinary retention and or patient discomfort Standing Status:   Standing Number of Occurrences:   1  
 NURSING ASSESSMENT:  SPECIFY Assess for GIP, routine, or respite level of care. Q SHIFT Routine Standing Status:   Standing Number of Occurrences:   1 Order Specific Question:   Please describe the test or procedure you would like to order. Answer:   Assess for GIP, routine, or respite level of care.   
 PAIN ASSESSMENT Pain and Symptoms: Assess ever 4 hours and PRN, for [facility] level of care. PRN Routine Standing Status:   Standing Number of Occurrences:   1 Order Specific Question:   Please describe the test or procedure you would like to order. Answer:   Pain and Symptoms: Assess ever 4 hours and PRN, for [facility] level of care.  BEDREST, COMPLETE Standing Status:   Standing Number of Occurrences:   1  ADVANCE DIET AS TOLERATED (nurse communication) This is a nursing order and not a diet order so must enter a separate diet order for starting diet. 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 Associated: Large Subtotal Left Cortical Stroke, AFib, HTN, Dysphagia, De... 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 Associated: Large Subtotal Left Cortical Stroke, AFib, HTN, Dysphagia, Delirium, Respiratory Failure with Hypoxia, Tachypnea, Non associated: Iron Deficiency Anemia, PUD, CAD Standing Status:   Standing Number of Occurrences:   1 Order Specific Question:   Description of Order: Answer:   admit  DO NOT RESUSCITATE Standing Status:   Standing Number of Occurrences:   1  
 OXYGEN CANNULA Liters per minute: 2; Indications for O2 therapy: RESPIRATORY DISTRESS PRN Routine Standing Status:   Standing Number of Occurrences:   1 Order Specific Question:   Liters per minute: Answer:   2 Order Specific Question:   Indications for O2 therapy Answer:   RESPIRATORY DISTRESS  DISCONTD: morphine injection 2 mg  DISCONTD: morphine injection 2 mg  OR Linked Order Group  haloperidol lactate (HALDOL) injection 2 mg  haloperidol lactate (HALDOL) injection 2 mg  OR Linked Order Group  haloperidol lactate (HALDOL) injection 2 mg  haloperidol lactate (HALDOL) injection 2 mg  sodium chloride (NS) flush 3 mL  sodium chloride (NS) flush 3 mL  acetaminophen (TYLENOL) suppository 650 mg  
 bisacodyL (DULCOLAX) suppository 10 mg  
 glycopyrrolate (ROBINUL) injection 0.2 mg  
 LORazepam (ATIVAN) injection 2 mg  DISCONTD: morphine injection 2 mg  OR Linked Order Group  morphine injection 4 mg  morphine injection 4 mg  OR Linked Order Group  morphine injection 4 mg  morphine injection 4 mg  carboxymethylcellulose sodium (REFRESH LIQUIGEL) 1 % ophthalmic solution 1 Drop  carboxymethylcellulose sodium (REFRESH LIQUIGEL) 1 % ophthalmic solution 1 Drop  INITIAL PHYSICIAN ORDER: HOSPICE Level Of Care: General Inpatient; Reason for Admission: 6/14: (SFD) Admitted GIP with sequelae of stroke for management of pain, dyspnea, dysphagia and agitation. Standing Status:   Standing Number of Occurrences:   1 Order Specific Question:   Status Answer:   Hospice Order Specific Question:   Level Of Care Answer:   General Inpatient Order Specific Question:   Reason for Admission Answer:   6/14: (SFD) Admitted GIP with sequelae of stroke for management of pain, dyspnea, dysphagia and agitation. Order Specific Question:   Inpatient Hospitalization Certified Necessary for the Following Reasons Answer:   3. Patient receiving treatment that can only be provided in an inpatient setting (further clarification in H&P documentation) Order Specific Question:   Admitting Diagnosis Answer:   Sequelae, post-stroke [[id number]] Order Specific Question:   Terminal Prognosis Diagnosis(es) Answer:   Sequelae, post-stroke [[id number]] Order Specific Question:   Admitting Physician Answer:   Trip Coleman Order Specific Question:   Attending Physician Answer:   Trip Coleman Order Specific Question:   Discharge Plan: Answer: Other (Specify)  IP CONSULT TO SOCIAL WORK Once on week one, then PRN for Psychosocial crisis intervention or per patient or caregiver request. 
For [facility] patients only. For contracted patients, primary hospice will continue to manage social work needs. Standing Status:   Standing Number of Occurrences:   1 Order Specific Question:   Reason for Consult: Answer: Once on week one, then PRN for Psychosocial crisis intervention or per patient or caregiver request.  
 
 
Allergies: 
No Known Allergies Care Plan: 
Encounter Problems (Active) Problem: Anticipatory Grief Dates: Start: 06/16/21 Disciplines: Interdisciplinary Goal: Grief heard and acknowledged, anxiety reduced, patient coping identified, patient/family expressed gratitude Dates: Start: 06/16/21   Expected End: 06/25/21 Disciplines: Interdisciplinary Intervention: Assess grief responses Dates: Start: 06/16/21 Description: Assess for feelings of grief Intervention: Support grieving process Dates: Start: 06/16/21 Description: Address feelings of loss, anticipatory grief, expression of concern. Problem: Anxiety/Agitation Dates: Start: 06/14/21 Description: The patient will be free from anxiety aeb pt/family/caregiver's ability to verbalize or demonstrate a need to manage the patient's anxiety throughout hospice care. Disciplines: Interdisciplinary Goal: Verbalize and demonstrate ability to manage anxiety Dates: Start: 06/14/21   Expected End: 06/17/21 Description: The patient/family/caregiver will verbalize and demonstrate ability to manage the patient's anxiety throughout hospice care. Disciplines: Interdisciplinary Intervention: Assess for anxiety/agitation Dates: Start: 06/14/21 Description: Assess for signs and symptoms of anxiety and agitation. Intervention: Instruction strategies to reduce anxiety/agitation Dates: Start: 06/14/21 Description: Instruct patient/caregiver on strategies to reduce anxiety/agitation. Problem: Aspiration - Risk of   
 Dates: Start: 06/16/21 Disciplines: Interdisciplinary Goal: *Absence of aspiration Dates: Start: 06/16/21   Expected End: 06/23/21 Disciplines: Interdisciplinary Intervention: Aspiration risk - signs and symptoms (eg: Ineffective cough; altered level of consciousness; impaired mobility; drooling; poor dentition; vomiting; slurred speech; prolonged supine) Dates: Start: 06/16/21 Intervention: Aspiration risk - history (eg:  Dysphagia; recurrent respiratory illness; neurologic disease/event; injury/surgery to mouth/throat; congenital anomaly; reflux; elderly) Dates: Start: 06/16/21 Intervention: Swallowing assessment (eg: Cough; drooling; oral cavity clearance; vocal quality; post swallow; mastication) Dates: Start: 06/16/21 Intervention: Oral intake management (eg: Head of bed elevation; diet texture modification; supervised oral intake; liquid consistency; upright position for 45 min. after oral intake and during feeding) Dates: Start: 06/16/21 Problem: Breathing Pattern - Ineffective Dates: Start: 06/14/21 Disciplines: Interdisciplinary Goal: *Absence of hypoxia Dates: Start: 06/14/21   Expected End: 06/17/21 Disciplines: Interdisciplinary Intervention: Breathing pattern signs and symptoms assessment (eg: Apnea; bradypnea; pursed-lip; dyspnea; hyperpnea; paradoxical; periodic; hyperventilation; hypoventilation; tachypnea) Dates: Start: 06/14/21  Intervention: Monitor for change in patient condition (eg: Vital signs; hypoxemia; mental status; level of consciousness; skin temperature, color) Dates: Start: 06/14/21 Intervention: Respiratory management (eg: Adequate humidification; early mobilization; perform manual percussion/vibration; postural drainage; suctioning; splinting) Dates: Start: 06/14/21 Intervention: Review for respiratory depression and administer medications as prescribed Dates: Start: 06/14/21 Problem: Emotional Support Needs Dates: Start: 06/16/21 Description: Pt, pt's nephew Mateusz Rogers, and pt's family will have their emotional support needs met weekly. Disciplines: Interdisciplinary Goal: Patient/family is receiving emotional support Dates: Start: 06/16/21   Expected End: 06/25/21 Description: Pt, Mateusz Rogers, and family will receive emotional support weekly through the use of validation of feelings, weekly check-ins from SW, and education on the hospice journey as needed. Disciplines: Interdisciplinary Intervention: Assess for emotional distress Dates: Start: 06/16/21 Description: SW will assess for emotional distress through open-ended questions, motivational interviewing, and observation of verbal and nonverbal communication. Intervention: Provide emotional support of the family's cultural expressions of grief and loss Dates: Start: 06/16/21 Description: SW will provide emotional support of the family's cultural expression of grief, loss, and response to illness. Problem: Falls - Risk of   
 Dates: Start: 06/14/21 Description: Pt will remain free from falls aeb no injuries while at [facility]. Disciplines: Interdisciplinary Goal: *Absence of Falls Dates: Start: 06/14/21   Expected End: 06/20/21 Description: Document Jericho Schimke Fall Risk and appropriate interventions in the flowsheet. Disciplines: Interdisciplinary  Problem: Infection - Risk of, Urinary Catheter-Associated Urinary Tract Infection Dates: Start: 06/15/21 Disciplines: Interdisciplinary Goal: *Absence of infection signs and symptoms Dates: Start: 06/15/21   Expected End: 06/17/21 Disciplines: Interdisciplinary Intervention: Urinary catheter needs assessment (eg: Impaired neurologic status; post void residual; spinal cord injury; urinary outflow obstruction; urinary retention; urinary incontinence; fluid imbalance) Dates: Start: 06/15/21 Intervention: Urine assessment (eg: Clarity; color; odor; volume) Dates: Start: 06/15/21 Intervention: Infection signs and symptoms monitoring - urinary tract (eg: Flank or suprapubic pain; burning; fever; dysuria; frequency; urgency; cloudy/bloody/foul odor urine; confusion/agitation; incontinence) Dates: Start: 06/15/21 Intervention: Lab monitoring (eg: Urinalysis; culture and sensitivity; complete blood count with differential) Dates: Start: 06/15/21 Intervention: Urinary catheter management (eg: Closed urinary catheter system maintenance; urinary catheter patency with free downhill flow; perineal care; monitor intake and output) Dates: Start: 06/15/21 Intervention: Urinary catheter discontinuation Dates: Start: 06/15/21 Description: REMINDER(s): 
Urinary catheter discontinuation promptly as indicated; remind physician to remove at or before day 5. Problem: Nausea/Vomiting (Adult) Dates: Start: 06/14/21 Disciplines: Interdisciplinary Goal: *Absence of nausea/vomiting Dates: Start: 06/14/21   Expected End: 06/17/21 Disciplines: Interdisciplinary Intervention: Dehydration signs and symptoms (eg: Weight/lab monitoring; vomiting/diarrhea/urine; tenting; mucous membranes; dizziness/lethargy/irritability/confusion; weak pulse; tachycardia; blood pressure; I&O) Dates: Start: 06/14/21  Intervention: Gastrointestinal assessment (eg:  Abdominal appearance; rebound tenderness; bowel control; nutrition tolerance; sclera color; skin color; emesis assessment) Dates: Start: 06/14/21 Intervention: Nutrition Care Process per nutrition screen Dates: Start: 06/14/21 Intervention: Vital signs assessment (eg: Blood pressure; body temperature; pulse rate; respiratory rate; pain) Dates: Start: 06/14/21 Intervention: Aspiration risk - history (eg:  Dysphagia; recurrent respiratory illness; neurologic disease/event; injury/surgery to mouth/throat; congenital anomaly; reflux; elderly) Dates: Start: 06/14/21 Intervention: Aspiration risk - signs and symptoms (eg: Ineffective cough; altered level of consciousness; impaired mobility; drooling; poor dentition; vomiting; slurred speech; prolonged supine) Dates: Start: 06/14/21 Intervention: Enteral tube feeding management (eg: Head of bed elevated 30-45 degrees; small bowel feedings; prokinetics) Dates: Start: 06/14/21 Intervention: Hydration management (eg: Electrolyte balance; IV fluid administration; fluid intake) Dates: Start: 06/14/21 Intervention: Intake and output measurement Dates: Start: 06/14/21 Intervention: Lateral position maintenance Dates: Start: 06/14/21 Intervention: Monitor for change in patient condition (eg: Altered mental status; monitor vital signs; diaphoresis; dizziness; pallor; dyspnea; palpitations) Dates: Start: 06/14/21 Intervention: Medication side-effect assessment Dates: Start: 06/14/21 Intervention: Nonpharmacologic nausea management (eg:  Consistent room temperature; deep breathing; distraction; ice chips; minimal moving; pain management) Dates: Start: 06/14/21 Problem: Pain Dates: Start: 06/14/21  Description: Pt will remain free from pain aeb pain score or flacc less than 4 while at [facility]. Disciplines: Nurse, Interdisciplinary, RT  
 Goal: *Control of Pain Dates: Start: 06/14/21   Expected End: 06/20/21 Description: Becky Hartman will have pain control AEB a FLACC score of 2 or less. Disciplines: Nurse, Interdisciplinary, RT Intervention: Assess pain characteristics (eg: Intensity scale; onset; location; quality; severity; duration; frequency; radiation) Dates: Start: 06/14/21 Intervention: Assess pain management - barriers (eg: Past pain experiences) Dates: Start: 06/14/21 Intervention: Identify pain expectations (eg: Patient's pain goal; somatic experiences; behavioral changes; affect) Dates: Start: 06/14/21 Intervention: Identify pain medication concerns (eg: Cultural considerations; addiction concerns) Dates: Start: 06/14/21 Intervention: Support system identification (eg: Caregiver; community resource; family; friends; [employer]; support group) Dates: Start: 06/14/21 Intervention: Monitor for change in patient condition (eg:  Vital signs changes; changes in level of consciousness; nausea; behavioral changes) Dates: Start: 06/14/21 Intervention: Medication side-effect assessment Dates: Start: 06/14/21 Intervention: Pain-relief response reassessment (eg: Frequency based on route of administration; effectiveness) Dates: Start: 06/14/21 Goal: *PALLIATIVE CARE:  Alleviation of Pain Dates: Start: 06/14/21   Expected End: 06/19/21 Disciplines: Nurse, Interdisciplinary, RT Intervention: Refer patient for holistic services per facility Dates: Start: 06/14/21 Problem: Patient Education: Go to Patient Education Activity Dates: Start: 06/16/21 Disciplines: Interdisciplinary Goal: Patient/Family Education Dates: Start: 06/16/21 Disciplines: Interdisciplinary  Problem: Pressure Injury - Risk of   
 Dates: Start: 06/14/21 Description: Pt will remain free from/ any further pressure injuries aeb no/progression pressure sores while at [facility]. Disciplines: Interdisciplinary Goal: *Prevention of pressure injury Dates: Start: 06/14/21   Expected End: 06/20/21 Description: Document Edd Scale and appropriate interventions in the flowsheet. Disciplines: Interdisciplinary Care Plan Problems/Goals Progressing Towards Goal (11) *Absence of Falls (Falls - Risk of) Disciplines:  Interdisciplinary Expected end:  06/20/21 Outcome: Progressing Towards Goal By Vida Dubin on 06/18/21 2372 *Prevention of pressure injury (Pressure Injury - Risk of) Disciplines:  Interdisciplinary Expected end:  06/20/21 Outcome: Progressing Towards Goal By Vida Dubin on 06/18/21 8618 *Control of Pain (Pain) Disciplines:  Nurse, Interdisciplinary, RT Expected end:  06/20/21 Outcome: Progressing Towards Goal By Vida Dubin on 06/18/21 3953 Verbalize and demonstrate ability to manage anxiety (Anxiety/Agitation) Disciplines:  Interdisciplinary Expected end:  06/17/21 Outcome: Progressing Towards Goal By Daryl Lundberg RN on 06/16/21 1002 *Absence of hypoxia (Breathing Pattern - Ineffective) Disciplines:  Interdisciplinary Expected end:  06/17/21 Outcome: Progressing Towards Goal By Macy Agudelo RN on 06/17/21 1869  
  
  
 
 *Absence of nausea/vomiting (Nausea/Vomiting (Adult)) Disciplines:  Interdisciplinary Expected end:  06/17/21 Outcome: Progressing Towards Goal By Macy Agudelo RN on 06/17/21 2064  
  
  
 
 *Absence of infection signs and symptoms (Infection - Risk of, Urinary Catheter-Associated Urinary Tract Infection) Disciplines:  Interdisciplinary Expected end:  06/17/21  Outcome: Progressing Towards Goal By Macy Agudelo RN on 06/17/21 0521  
  
  
 
 *Absence of aspiration (Aspiration - Risk of) Disciplines:  Interdisciplinary Expected end:  06/23/21 Outcome: Progressing Towards Goal By Cristy Reyes RN on 06/17/21 4650 Patient/Family Education (Patient Education: Go to Patient Education Activity) Disciplines:  Interdisciplinary Expected end:  - Outcome: Progressing Towards Goal By Tomasz Irvin RN on 06/16/21 1012 Patient/family is receiving emotional support (Emotional Support Needs) Disciplines:  Interdisciplinary Expected end:  06/25/21 Outcome: Progressing Towards Goal By Cristy Reyes RN on 06/17/21 3524 Grief heard and acknowledged, anxiety reduced, patient coping identified, patient/family expressed gratitude (Anticipatory Grief) Disciplines:  Interdisciplinary Expected end:  06/25/21 Outcome: Progressing Towards Goal By Amarilis Cain on 06/16/21 1622 No Outcome (1) *PALLIATIVE CARE:  Alleviation of Pain (Pain) Disciplines:  Nurse, Interdisciplinary, RT Expected end:  06/19/21 Resolved/Met (3) Prevent transmission of infectious organism to others (Risk for Spread of Infection) Disciplines:  Interdisciplinary Expected end:  - Outcome: Resolved/Met By Cristy Reyes RN on 06/16/21 8916 Patient/Family Education (Patient Education: Go to Patient Education Activity) Disciplines:  Interdisciplinary Expected end:  - Outcome: Resolved/Met By Tomasz Irvin RN on 06/16/21 1002 Patient/Family Education (Patient Education: Go to Patient Education Activity) Disciplines:  Interdisciplinary Expected end:  - Outcome: Resolved/Met By Tomasz Irvin RN on 06/16/21 1002  
  
  
 
  
  
  
  
  
 
 
___________________ Care Team Notes POC/IDG Notes [address] [place] [address] IDG  Notes by Feli Jade LMSW at 06/18/21 1146  Version 1 of 1 Author: Kayla Hughes [facility] Service: Julia Kramer Author Type: Licensed Masters in Social Work Filed: 06/18/21 1150 Date of Service: 06/18/21 1146 Status: Signed : Kayla Hughes [address] [facility] [address] (Licensed Masters in Social Work) Patient: Stefania Hawkins Date: 06/18/21 Time: 11:46 AM 
 
[facility] [place]  Notes Pt remains under GIP LOC. No changes in the plan of care. SW will continue to provide ongoing emotional support and assessment of psychosocial and bereavement concerns, as well as needs until discharge. Signed by: Ton Frye LMSW [facility] IDG Volunteer Notes by García June at 06/18/21 1146  Version 1 of 1 Author: García June Service: Hospice and Palliative Care Author Type: Hospice Volunteer/ Filed: 06/18/21 1149 Date of Service: 06/18/21 1146 Status: Signed : García June [facility] Volunteer/) Patient: Stefania Hawkins Date: 06/18/21 Time: 11:46 AM 
 
[facility] Volunteer Notes Volunteer  Hospice Interdisciplinary Plan of Care Review Status Codes  C=Continued R=Revised RS = Resolved NV= No visits per Infection Control Policy or family request 
  
C. Volunteer Goal: Hospice house volunteer (s) enhances the quality of remaining life while patient is at the hospice house. Pt will continue to receive general support by volunteers as evidenced by comfort bag delivery, snack cart, supplies to the room, companionship visits, pet therapy and/or therapeutic music. Any other special requests or information for volunteer: not at this time. Signed by: Lavelle Varela [facility] IDG  Notes by Ricardo Nelson at 06/18/21 1146  Version 1 of 1 Author: Ricardo Nelson Service: Spiritual Care Author Type: Pastoral Care Filed: 06/18/21 1148 Date of Service: 06/18/21 1146 Status: Signed : Ricardo Nelson (Pastoral Care) Patient: Stefania Hawkins Date: 06/18/21 Time: 11:46 AM 
 
[facility]  Notes Intervention: Ministry of presence, scripture, prayer, family support via telephone. Outcome: Patient appeared to be obtunded. His nephew Tamara Reyes shared his connection with the patient via telephone. He shared his uncle's Tawanna Tradition. Plan:Continue to provide spiritual and emotional support. See care plan. Signed by: Rodney Jarrett [address] [place] IDG Nurse Notes by Geraldine Gates RN at 06/15/21 1053  Version 1 of 1 Author: Geraldine Gates RN Service: NURSING Author Type: Registered Nurse Filed: 06/15/21 1053 Date of Service: 06/15/21 1053 Status: Signed : Geraldine Gates RN (Registered Nurse) Patient: Jose Manuel Ibrahim Date: 06/15/21 Time: 10:53 AM 
 
[facility] Nurse Notes 1st IDG: Pt is a 81year-old Male with Sequalae of Stroke who is here GIP level of care for management of pain and agitation. Morgan with UOP of 350ml IV access: PIV LAC & RAC 
 PO intake: NPO Oxygen: 2/L PRN Wounds: None PRN medications: Haldol 2mg x 1 dose for agitation / Morphine 2mg x 2 doses for pain Scheduled meds:  None Plan:  Comprehensive plan of care reviewed. IDG and pt./family in agreement with plan of care. The IDG identifies through on-going assessment when a change is needed to the POC; the pt/family will receive care and services necessitated by changes in POC. Medications reviewed by the pharmacist and Medical Director. Signed by: Erendira Clay RN 
 
  
[address] IDG Volunteer Notes by Renetta Strong at 06/15/21 0365  Version 1 of 1 Author: Renetta Strong Service: Hospice and Palliative Care Author Type: Hospice Volunteer/ Filed: 06/15/21 0953 Date of Service: 06/15/21 0947 Status: Signed : Renetta Strong [facility] Volunteer/) Patient: Jose Manuel Ibrahim Date: 06/15/21 Time: 9:47 AM 
 
[facility] Volunteer Notes VC's Initial Hospice House IDG Note: (to be used at first IDG on patient) Volunteer  Hospice Interdisciplinary Plan of Care Review Status Codes I = Initiated   NV= No visits per Infection Control Policy or family request 
  
I.  Volunteer Goal: Hospice house volunteer (s) enhances the quality of remaining life while patient is at the hospice house. Interventions: Windy Cal Windy Cal Lorella Libman Volunteer (s) will provide companionship to the patient and/or family by visiting at the hospice house Windy Cal Windy Cal Lorella Libman Volunteer (s) will provide respite as needed when requested by patient and/or family. Danielle Smiths  Volunteer will provide activities such as music, reading, pet therapy, etc. as requested. Danielle Link Likes  Comfort bag delivered. Any other special requests or information regarding volunteer services: No further needs identified at this time. Signed by: Beth GATES [facility] IDG  Notes by Daniel Laguna LMSW at 06/15/21 5762  Version 1 of 1 Author: Daniel Laguna LMSW Service: Merwyn Dear Author Type: Licensed Masters in Social Work Filed: 06/15/21 0948 Date of Service: 06/15/21 0948 Status: Signed : Daniel Laguna [address] [facility] (Licensed Masters in Social Work) Patient: Ariel Guan Date: 06/15/21 Time: 9:48 AM 
 
[facility]  Notes SW has read the initial comprehensive assessment and plan of care. No immediate needs noted. Initial  assessment visit will be completed within 5 days of admission. Signed by: Diana Beckford LMSW [facility] IDG  Notes by Kelli Cruz at 06/15/21 6172  Version 1 of 1 Author: Kelli Cruz Service: Spiritual Care Author Type: Pastoral Care Filed: 06/15/21 0943 Date of Service: 06/15/21 0942 Status: Signed : Kelli Cruz (Pastoral Care) Patient: Ariel Guan Date: 06/15/21 Time: 9:42 AM 
 
[facility]  Notes / Grief Counselor has reviewed  Initial Comprehensive Assessment and plan of care.  Bereavement and Spiritual Care Assessments to be completed and plan of care put in place to meet the needs. Signed by: Regina Wynn [facility] IDG Nurse Notes by Sherri Vázquez at 21  Version 1 of 1 Author: Sherri Vázquez Service: Shandra Leon Author Type: Registered Nurse Filed: 21 Date of Service: 21 Status: Signed : Sherri Vázquez (Registered Nurse) Patient: Mc Patino Date: 21 Time: 7:34 PM 
 
[facility] Nurse Notes Mr. Steven Ochoa was admitted to room 108 around [id number]. He was transported from [facility] by EMS. 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 Will continue to assess need for change in LOC and continue to collaborate with IDG team regarding d/c planning. Upon arrival, patient was showing signs of pain and agitation aeb restlessness and pulling down covers. Patient medicated with prn morphine for pain and prn haldol for agitation. Patient nonverbal, but opens eyes spontaneously. Pt presents with gonzalez and two working ivs. His skin is intact and all extremities cold and mottled. Patient is on room air. Bed is low and locked, tab alert in place, and door left open for continuous monitoring. Pt's next of kin is his nephew, Andrea Michaud. Azalea Piles at [phone] and updated him on patient condition. Andrea Michaud confirmed [employer] as patient's  home. Admission complete and initial general hospice care plan initiated which includes spiritual, psychosocial ,and bereavement. IDG team, including MD, made aware of plan of care. Signed by: Rey Mojica Care Team Present:  
Team Members Present: Physician, , , Vol Coordinator, Other (Comment) Physician Team Member: Dr. Noemy Campa Nurse Team Member: Dariela Beach Social Work Team Member: Beau Blue  Team Member: Ana Villegas Vol Coordinator: Alejandra Maradiaga Other Discipline Present (Name):  Aga Rider NP

## 2023-04-08 NOTE — [facility] IDG SOCIAL WORKER NOTES
Patient: Angel Luis Sullivan Date: 06/15/21 Time: 9:48 AM 
 
[facility]  Notes SW has read the initial comprehensive assessment and plan of care. No immediate needs noted. Initial SW assessment visit will be completed within 5 days of admission. Signed by: Carmen Narayan LMSW [facility]